# Patient Record
Sex: MALE | Race: WHITE | NOT HISPANIC OR LATINO | Employment: UNEMPLOYED | URBAN - NONMETROPOLITAN AREA
[De-identification: names, ages, dates, MRNs, and addresses within clinical notes are randomized per-mention and may not be internally consistent; named-entity substitution may affect disease eponyms.]

---

## 2024-05-06 ENCOUNTER — APPOINTMENT (OUTPATIENT)
Dept: CT IMAGING | Facility: HOSPITAL | Age: 64
DRG: 282 | End: 2024-05-06
Payer: COMMERCIAL

## 2024-05-06 ENCOUNTER — HOSPITAL ENCOUNTER (INPATIENT)
Facility: HOSPITAL | Age: 64
LOS: 1 days | Discharge: ANOTHER HOSPITAL | DRG: 282 | End: 2024-05-07
Attending: STUDENT IN AN ORGANIZED HEALTH CARE EDUCATION/TRAINING PROGRAM | Admitting: HOSPITALIST
Payer: COMMERCIAL

## 2024-05-06 ENCOUNTER — APPOINTMENT (OUTPATIENT)
Dept: GENERAL RADIOLOGY | Facility: HOSPITAL | Age: 64
DRG: 282 | End: 2024-05-06
Payer: COMMERCIAL

## 2024-05-06 DIAGNOSIS — I21.4 NSTEMI (NON-ST ELEVATED MYOCARDIAL INFARCTION): Primary | ICD-10-CM

## 2024-05-06 LAB
ALBUMIN SERPL-MCNC: 4.3 G/DL (ref 3.5–5.2)
ALBUMIN/GLOB SERPL: 1.6 G/DL
ALP SERPL-CCNC: 97 U/L (ref 39–117)
ALT SERPL W P-5'-P-CCNC: 16 U/L (ref 1–41)
ANION GAP SERPL CALCULATED.3IONS-SCNC: 10.4 MMOL/L (ref 5–15)
APTT PPP: 132 SECONDS (ref 26.5–34.5)
AST SERPL-CCNC: 18 U/L (ref 1–40)
BASOPHILS # BLD AUTO: 0.07 10*3/MM3 (ref 0–0.2)
BASOPHILS NFR BLD AUTO: 0.7 % (ref 0–1.5)
BILIRUB SERPL-MCNC: 0.4 MG/DL (ref 0–1.2)
BUN SERPL-MCNC: 16 MG/DL (ref 8–23)
BUN/CREAT SERPL: 13.8 (ref 7–25)
CALCIUM SPEC-SCNC: 9.6 MG/DL (ref 8.6–10.5)
CHLORIDE SERPL-SCNC: 100 MMOL/L (ref 98–107)
CK SERPL-CCNC: 90 U/L (ref 20–200)
CO2 SERPL-SCNC: 28.6 MMOL/L (ref 22–29)
CREAT SERPL-MCNC: 1.16 MG/DL (ref 0.76–1.27)
DEPRECATED RDW RBC AUTO: 46.2 FL (ref 37–54)
EGFRCR SERPLBLD CKD-EPI 2021: 71.7 ML/MIN/1.73
EOSINOPHIL # BLD AUTO: 0.21 10*3/MM3 (ref 0–0.4)
EOSINOPHIL NFR BLD AUTO: 2.2 % (ref 0.3–6.2)
ERYTHROCYTE [DISTWIDTH] IN BLOOD BY AUTOMATED COUNT: 13.6 % (ref 12.3–15.4)
GEN 5 2HR TROPONIN T REFLEX: 27 NG/L
GLOBULIN UR ELPH-MCNC: 2.7 GM/DL
GLUCOSE SERPL-MCNC: 86 MG/DL (ref 65–99)
HBA1C MFR BLD: 6.2 % (ref 4.8–5.6)
HCT VFR BLD AUTO: 44.6 % (ref 37.5–51)
HGB BLD-MCNC: 14.8 G/DL (ref 13–17.7)
HOLD SPECIMEN: NORMAL
HOLD SPECIMEN: NORMAL
IMM GRANULOCYTES # BLD AUTO: 0.03 10*3/MM3 (ref 0–0.05)
IMM GRANULOCYTES NFR BLD AUTO: 0.3 % (ref 0–0.5)
INR PPP: 0.93 (ref 0.9–1.1)
LYMPHOCYTES # BLD AUTO: 1.59 10*3/MM3 (ref 0.7–3.1)
LYMPHOCYTES NFR BLD AUTO: 16.6 % (ref 19.6–45.3)
MCH RBC QN AUTO: 30.4 PG (ref 26.6–33)
MCHC RBC AUTO-ENTMCNC: 33.2 G/DL (ref 31.5–35.7)
MCV RBC AUTO: 91.6 FL (ref 79–97)
MONOCYTES # BLD AUTO: 0.68 10*3/MM3 (ref 0.1–0.9)
MONOCYTES NFR BLD AUTO: 7.1 % (ref 5–12)
NEUTROPHILS NFR BLD AUTO: 6.98 10*3/MM3 (ref 1.7–7)
NEUTROPHILS NFR BLD AUTO: 73.1 % (ref 42.7–76)
NRBC BLD AUTO-RTO: 0 /100 WBC (ref 0–0.2)
PLATELET # BLD AUTO: 238 10*3/MM3 (ref 140–450)
PMV BLD AUTO: 10.4 FL (ref 6–12)
POTASSIUM SERPL-SCNC: 4.1 MMOL/L (ref 3.5–5.2)
PROT SERPL-MCNC: 7 G/DL (ref 6–8.5)
PROTHROMBIN TIME: 12.5 SECONDS (ref 12.1–14.7)
QT INTERVAL: 358 MS
QTC INTERVAL: 386 MS
RBC # BLD AUTO: 4.87 10*6/MM3 (ref 4.14–5.8)
SODIUM SERPL-SCNC: 139 MMOL/L (ref 136–145)
TROPONIN T DELTA: 9 NG/L
TROPONIN T SERPL HS-MCNC: 18 NG/L
TROPONIN T SERPL HS-MCNC: 67 NG/L
UFH PPP CHRO-ACNC: 0.44 IU/ML (ref 0.3–0.7)
WBC NRBC COR # BLD AUTO: 9.56 10*3/MM3 (ref 3.4–10.8)
WHOLE BLOOD HOLD COAG: NORMAL
WHOLE BLOOD HOLD SPECIMEN: NORMAL

## 2024-05-06 PROCEDURE — 25510000001 IOPAMIDOL PER 1 ML: Performed by: STUDENT IN AN ORGANIZED HEALTH CARE EDUCATION/TRAINING PROGRAM

## 2024-05-06 PROCEDURE — 75574 CT ANGIO HRT W/3D IMAGE: CPT | Performed by: RADIOLOGY

## 2024-05-06 PROCEDURE — 82550 ASSAY OF CK (CPK): CPT | Performed by: STUDENT IN AN ORGANIZED HEALTH CARE EDUCATION/TRAINING PROGRAM

## 2024-05-06 PROCEDURE — 99285 EMERGENCY DEPT VISIT HI MDM: CPT

## 2024-05-06 PROCEDURE — 71045 X-RAY EXAM CHEST 1 VIEW: CPT

## 2024-05-06 PROCEDURE — 99223 1ST HOSP IP/OBS HIGH 75: CPT | Performed by: HOSPITALIST

## 2024-05-06 PROCEDURE — 71045 X-RAY EXAM CHEST 1 VIEW: CPT | Performed by: RADIOLOGY

## 2024-05-06 PROCEDURE — 84484 ASSAY OF TROPONIN QUANT: CPT | Performed by: STUDENT IN AN ORGANIZED HEALTH CARE EDUCATION/TRAINING PROGRAM

## 2024-05-06 PROCEDURE — 85730 THROMBOPLASTIN TIME PARTIAL: CPT | Performed by: STUDENT IN AN ORGANIZED HEALTH CARE EDUCATION/TRAINING PROGRAM

## 2024-05-06 PROCEDURE — 93010 ELECTROCARDIOGRAM REPORT: CPT | Performed by: INTERNAL MEDICINE

## 2024-05-06 PROCEDURE — 75574 CT ANGIO HRT W/3D IMAGE: CPT

## 2024-05-06 PROCEDURE — 25010000002 HEPARIN (PORCINE) 25000-0.45 UT/250ML-% SOLUTION: Performed by: STUDENT IN AN ORGANIZED HEALTH CARE EDUCATION/TRAINING PROGRAM

## 2024-05-06 PROCEDURE — 83036 HEMOGLOBIN GLYCOSYLATED A1C: CPT | Performed by: HOSPITALIST

## 2024-05-06 PROCEDURE — 85520 HEPARIN ASSAY: CPT | Performed by: STUDENT IN AN ORGANIZED HEALTH CARE EDUCATION/TRAINING PROGRAM

## 2024-05-06 PROCEDURE — 85025 COMPLETE CBC W/AUTO DIFF WBC: CPT | Performed by: STUDENT IN AN ORGANIZED HEALTH CARE EDUCATION/TRAINING PROGRAM

## 2024-05-06 PROCEDURE — 25010000002 HEPARIN (PORCINE) PER 1000 UNITS: Performed by: STUDENT IN AN ORGANIZED HEALTH CARE EDUCATION/TRAINING PROGRAM

## 2024-05-06 PROCEDURE — 93005 ELECTROCARDIOGRAM TRACING: CPT | Performed by: STUDENT IN AN ORGANIZED HEALTH CARE EDUCATION/TRAINING PROGRAM

## 2024-05-06 PROCEDURE — 85610 PROTHROMBIN TIME: CPT | Performed by: STUDENT IN AN ORGANIZED HEALTH CARE EDUCATION/TRAINING PROGRAM

## 2024-05-06 PROCEDURE — 36415 COLL VENOUS BLD VENIPUNCTURE: CPT

## 2024-05-06 PROCEDURE — 80053 COMPREHEN METABOLIC PANEL: CPT | Performed by: STUDENT IN AN ORGANIZED HEALTH CARE EDUCATION/TRAINING PROGRAM

## 2024-05-06 RX ORDER — AMOXICILLIN 250 MG
2 CAPSULE ORAL 2 TIMES DAILY PRN
Status: DISCONTINUED | OUTPATIENT
Start: 2024-05-06 | End: 2024-05-07 | Stop reason: HOSPADM

## 2024-05-06 RX ORDER — METOPROLOL TARTRATE 50 MG/1
50 TABLET, FILM COATED ORAL ONCE AS NEEDED
Status: COMPLETED | OUTPATIENT
Start: 2024-05-06 | End: 2024-05-06

## 2024-05-06 RX ORDER — CHOLECALCIFEROL (VITAMIN D3) 125 MCG
10 CAPSULE ORAL ONCE
Status: DISCONTINUED | OUTPATIENT
Start: 2024-05-06 | End: 2024-05-07 | Stop reason: HOSPADM

## 2024-05-06 RX ORDER — ASPIRIN 81 MG/1
81 TABLET, CHEWABLE ORAL DAILY
Status: DISCONTINUED | OUTPATIENT
Start: 2024-05-07 | End: 2024-05-07 | Stop reason: HOSPADM

## 2024-05-06 RX ORDER — NITROGLYCERIN 0.4 MG/1
0.4 TABLET SUBLINGUAL ONCE
Status: COMPLETED | OUTPATIENT
Start: 2024-05-06 | End: 2024-05-06

## 2024-05-06 RX ORDER — HEPARIN SODIUM 10000 [USP'U]/100ML
12 INJECTION, SOLUTION INTRAVENOUS
Status: DISCONTINUED | OUTPATIENT
Start: 2024-05-06 | End: 2024-05-07 | Stop reason: HOSPADM

## 2024-05-06 RX ORDER — SODIUM CHLORIDE 9 MG/ML
75 INJECTION, SOLUTION INTRAVENOUS CONTINUOUS
Status: DISCONTINUED | OUTPATIENT
Start: 2024-05-07 | End: 2024-05-07 | Stop reason: HOSPADM

## 2024-05-06 RX ORDER — SODIUM CHLORIDE 0.9 % (FLUSH) 0.9 %
10 SYRINGE (ML) INJECTION EVERY 12 HOURS SCHEDULED
Status: DISCONTINUED | OUTPATIENT
Start: 2024-05-07 | End: 2024-05-07 | Stop reason: HOSPADM

## 2024-05-06 RX ORDER — HEPARIN SODIUM 5000 [USP'U]/ML
25 INJECTION, SOLUTION INTRAVENOUS; SUBCUTANEOUS AS NEEDED
Status: DISCONTINUED | OUTPATIENT
Start: 2024-05-06 | End: 2024-05-07 | Stop reason: HOSPADM

## 2024-05-06 RX ORDER — HEPARIN SODIUM 5000 [USP'U]/ML
50 INJECTION, SOLUTION INTRAVENOUS; SUBCUTANEOUS AS NEEDED
Status: DISCONTINUED | OUTPATIENT
Start: 2024-05-06 | End: 2024-05-07 | Stop reason: HOSPADM

## 2024-05-06 RX ORDER — POLYETHYLENE GLYCOL 3350 17 G/17G
17 POWDER, FOR SOLUTION ORAL DAILY PRN
Status: DISCONTINUED | OUTPATIENT
Start: 2024-05-06 | End: 2024-05-07 | Stop reason: HOSPADM

## 2024-05-06 RX ORDER — BISACODYL 10 MG
10 SUPPOSITORY, RECTAL RECTAL DAILY PRN
Status: DISCONTINUED | OUTPATIENT
Start: 2024-05-06 | End: 2024-05-07 | Stop reason: HOSPADM

## 2024-05-06 RX ORDER — SODIUM CHLORIDE 9 MG/ML
40 INJECTION, SOLUTION INTRAVENOUS AS NEEDED
Status: DISCONTINUED | OUTPATIENT
Start: 2024-05-06 | End: 2024-05-07 | Stop reason: HOSPADM

## 2024-05-06 RX ORDER — HEPARIN SODIUM 5000 [USP'U]/ML
4000 INJECTION, SOLUTION INTRAVENOUS; SUBCUTANEOUS ONCE
Status: COMPLETED | OUTPATIENT
Start: 2024-05-06 | End: 2024-05-06

## 2024-05-06 RX ORDER — NITROGLYCERIN 0.4 MG/1
0.4 TABLET SUBLINGUAL
Status: DISCONTINUED | OUTPATIENT
Start: 2024-05-06 | End: 2024-05-07 | Stop reason: HOSPADM

## 2024-05-06 RX ORDER — ASPIRIN 325 MG
325 TABLET ORAL ONCE
Status: COMPLETED | OUTPATIENT
Start: 2024-05-06 | End: 2024-05-06

## 2024-05-06 RX ORDER — SODIUM CHLORIDE 0.9 % (FLUSH) 0.9 %
10 SYRINGE (ML) INJECTION AS NEEDED
Status: DISCONTINUED | OUTPATIENT
Start: 2024-05-06 | End: 2024-05-07 | Stop reason: HOSPADM

## 2024-05-06 RX ORDER — ROSUVASTATIN CALCIUM 20 MG/1
20 TABLET, COATED ORAL NIGHTLY
Status: DISCONTINUED | OUTPATIENT
Start: 2024-05-07 | End: 2024-05-07 | Stop reason: HOSPADM

## 2024-05-06 RX ORDER — ROSUVASTATIN CALCIUM 20 MG/1
20 TABLET, COATED ORAL ONCE
Status: COMPLETED | OUTPATIENT
Start: 2024-05-06 | End: 2024-05-06

## 2024-05-06 RX ORDER — BISACODYL 5 MG/1
5 TABLET, DELAYED RELEASE ORAL DAILY PRN
Status: DISCONTINUED | OUTPATIENT
Start: 2024-05-06 | End: 2024-05-07 | Stop reason: HOSPADM

## 2024-05-06 RX ADMIN — ASPIRIN 325 MG: 325 TABLET ORAL at 15:03

## 2024-05-06 RX ADMIN — IOPAMIDOL 100 ML: 755 INJECTION, SOLUTION INTRAVENOUS at 18:14

## 2024-05-06 RX ADMIN — HEPARIN SODIUM 12 UNITS/KG/HR: 10000 INJECTION, SOLUTION INTRAVENOUS at 22:03

## 2024-05-06 RX ADMIN — METOPROLOL TARTRATE 50 MG: 50 TABLET, FILM COATED ORAL at 16:59

## 2024-05-06 RX ADMIN — HEPARIN SODIUM 4000 UNITS: 5000 INJECTION INTRAVENOUS; SUBCUTANEOUS at 22:01

## 2024-05-06 RX ADMIN — NITROGLYCERIN 0.4 MG: 0.4 TABLET, ORALLY DISINTEGRATING SUBLINGUAL at 18:05

## 2024-05-06 RX ADMIN — ROSUVASTATIN CALCIUM 20 MG: 20 TABLET, FILM COATED ORAL at 22:04

## 2024-05-06 NOTE — ED PROVIDER NOTES
"Subjective   History of Present Illness  Patient is a 63-year-old male who comes to the ER with complaints of chest pain.  Chest pain started around 1 PM today.  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  He says it was like an elephant and heavy.  It radiated to his bilateral neck.  At the worst it was a 3/10.  It did not get worse with exertion.  Did not get worse with rest.  He said it was continuous for about an hour and did not ease up.  He \"spit up \"but did not feel nauseous.  He thought maybe he was dehydrated as well.  He does note he has been having intermittent sharp pains during his weekly jogs but has not thought much of it and try to shake it off.  He also tried to push through it today but could not due to the persistence of pain.  He took a Tylenol, did not have any nitroglycerin and came to the ER for evaluation.    At the time of my exam, patient denies any significant chest pain, rates it a 1/10.  Received a full dose aspirin on arrival.      Review of Systems   Constitutional:  Negative for chills, fatigue and fever.   HENT:  Negative for congestion, sinus pain and sore throat.    Respiratory:  Negative for cough, chest tightness, shortness of breath and wheezing.    Cardiovascular:  Positive for chest pain. Negative for palpitations and leg swelling.   Gastrointestinal:  Negative for abdominal pain, constipation, diarrhea, nausea and vomiting.   Genitourinary:  Negative for dysuria, frequency, hematuria and urgency.   Musculoskeletal:  Negative for arthralgias and myalgias.   Neurological:  Negative for dizziness, numbness and headaches.   Psychiatric/Behavioral:  Negative for confusion.        No past medical history on file.    No Known Allergies    No past surgical history on file.    No family history on file.    Social History     Socioeconomic History    Marital status:  "           Objective   Physical Exam  Vitals and nursing note reviewed.   Constitutional:       General: He is not in acute distress.     Appearance: He is well-developed and normal weight. He is not ill-appearing.   HENT:      Head: Normocephalic.      Mouth/Throat:      Mouth: Mucous membranes are moist.      Pharynx: Oropharynx is clear.   Eyes:      Extraocular Movements: Extraocular movements intact.      Pupils: Pupils are equal, round, and reactive to light.   Neck:      Vascular: No JVD.   Cardiovascular:      Rate and Rhythm: Normal rate and regular rhythm.      Heart sounds: No murmur heard.     No friction rub. No gallop.   Pulmonary:      Effort: Pulmonary effort is normal. No tachypnea.      Breath sounds: Normal breath sounds. No decreased breath sounds, wheezing, rhonchi or rales.   Chest:      Chest wall: No tenderness.   Abdominal:      General: Bowel sounds are normal.      Palpations: Abdomen is soft.   Musculoskeletal:         General: Normal range of motion.      Cervical back: Normal range of motion and neck supple.      Right lower leg: No edema.      Left lower leg: No edema.   Skin:     General: Skin is warm and dry.      Capillary Refill: Capillary refill takes less than 2 seconds.   Neurological:      General: No focal deficit present.      Mental Status: He is alert and oriented to person, place, and time.   Psychiatric:         Mood and Affect: Mood normal.         Behavior: Behavior normal.         Procedures           ED Course  ED Course as of 05/06/24 2210   Mon May 06, 2024   1413 EKG notes sinus rhythm.  70 bpm.  No acute ST elevation.  Possible early repolarization noted.  70 bpm.  QTc 386.  Electronically signed by Jorge A Alarcon DO, 05/06/24, 2:14 PM EDT.   [SF]   0790 ECG 12 Lead Chest Pain  Sinus bradycardia, rate 55, QTc 382, no acute ST or T wave changes [CW]      ED Course User Index  [CW] Camden Da Silva DO  [SF] Jorge A Alarcon DO                HEART Score:  5                              Medical Decision Making  --He describes crescendo like chest pain and had exertional chest pain today, EKG no significant ischemic changes  --Initial troponin 18, repeat 27, 67  --Repeat EKG kwaku w/out ischemic changes  --d/w cards, rec CTA coronary--> high-grade hemodynamically significant stenosis of the LAD/RCA with recs for heart cath  --Discussed with interventional cardiology, n.p.o. at midnight, tentative plans for heart cath tomorrow   --discussed with medicine, will admit    Problems Addressed:  NSTEMI (non-ST elevated myocardial infarction): complicated acute illness or injury    Amount and/or Complexity of Data Reviewed  Labs: ordered.  Radiology: ordered.  ECG/medicine tests: ordered. Decision-making details documented in ED Course.    Risk  OTC drugs.  Prescription drug management.        Final diagnoses:   NSTEMI (non-ST elevated myocardial infarction)       ED Disposition  ED Disposition       ED Disposition   Decision to Admit    Condition   --    Comment   --               No follow-up provider specified.       Medication List      No changes were made to your prescriptions during this visit.            Camden Da Silva DO  05/06/24 [id number]

## 2024-05-07 ENCOUNTER — HOSPITAL ENCOUNTER (INPATIENT)
Facility: HOSPITAL | Age: 64
LOS: 1 days | Discharge: HOME | End: 2024-05-08
Attending: INTERNAL MEDICINE | Admitting: INTERNAL MEDICINE
Payer: COMMERCIAL

## 2024-05-07 VITALS
SYSTOLIC BLOOD PRESSURE: 150 MMHG | WEIGHT: 186.5 LBS | OXYGEN SATURATION: 98 % | HEIGHT: 71 IN | RESPIRATION RATE: 16 BRPM | DIASTOLIC BLOOD PRESSURE: 96 MMHG | BODY MASS INDEX: 26.11 KG/M2 | TEMPERATURE: 98.2 F | HEART RATE: 81 BPM

## 2024-05-07 PROBLEM — I24.9 ACS (ACUTE CORONARY SYNDROME): Status: ACTIVE | Noted: 2024-05-07

## 2024-05-07 LAB
ALBUMIN SERPL-MCNC: 3.9 G/DL (ref 3.5–5.2)
ALBUMIN SERPL-MCNC: 4.1 G/DL (ref 3.5–5.2)
ALBUMIN/GLOB SERPL: 1.4 G/DL
ALBUMIN/GLOB SERPL: 1.7 G/DL
ALP SERPL-CCNC: 100 U/L (ref 39–117)
ALP SERPL-CCNC: 101 U/L (ref 39–117)
ALT SERPL W P-5'-P-CCNC: 16 U/L (ref 1–41)
ALT SERPL W P-5'-P-CCNC: 17 U/L (ref 1–41)
ANION GAP SERPL CALCULATED.3IONS-SCNC: 11 MMOL/L (ref 5–15)
ANION GAP SERPL CALCULATED.3IONS-SCNC: 6.2 MMOL/L (ref 5–15)
APTT PPP: 44.7 SECONDS (ref 22–39)
AST SERPL-CCNC: 27 U/L (ref 1–40)
AST SERPL-CCNC: 29 U/L (ref 1–40)
BASOPHILS # BLD AUTO: 0.07 10*3/MM3 (ref 0–0.2)
BASOPHILS NFR BLD AUTO: 0.5 % (ref 0–1.5)
BILIRUB SERPL-MCNC: 0.5 MG/DL (ref 0–1.2)
BILIRUB SERPL-MCNC: 0.8 MG/DL (ref 0–1.2)
BUN SERPL-MCNC: 11 MG/DL (ref 8–23)
BUN SERPL-MCNC: 15 MG/DL (ref 8–23)
BUN/CREAT SERPL: 12.4 (ref 7–25)
BUN/CREAT SERPL: 14.4 (ref 7–25)
CALCIUM SPEC-SCNC: 9 MG/DL (ref 8.6–10.5)
CALCIUM SPEC-SCNC: 9.1 MG/DL (ref 8.6–10.5)
CATH EF ESTIMATED: 55 %
CHLORIDE SERPL-SCNC: 102 MMOL/L (ref 98–107)
CHLORIDE SERPL-SCNC: 103 MMOL/L (ref 98–107)
CHOLEST SERPL-MCNC: 194 MG/DL (ref 0–200)
CK MB SERPL-CCNC: 20.89 NG/ML
CK SERPL-CCNC: 187 U/L (ref 20–200)
CO2 SERPL-SCNC: 24 MMOL/L (ref 22–29)
CO2 SERPL-SCNC: 27.8 MMOL/L (ref 22–29)
CREAT SERPL-MCNC: 0.89 MG/DL (ref 0.76–1.27)
CREAT SERPL-MCNC: 1.04 MG/DL (ref 0.76–1.27)
D-LACTATE SERPL-SCNC: 1.7 MMOL/L (ref 0.5–2)
DEPRECATED RDW RBC AUTO: 47.1 FL (ref 37–54)
DEPRECATED RDW RBC AUTO: 47.9 FL (ref 37–54)
EGFRCR SERPLBLD CKD-EPI 2021: 81.7 ML/MIN/1.73
EGFRCR SERPLBLD CKD-EPI 2021: 97.5 ML/MIN/1.73
EOSINOPHIL # BLD AUTO: 0.28 10*3/MM3 (ref 0–0.4)
EOSINOPHIL NFR BLD AUTO: 1.9 % (ref 0.3–6.2)
ERYTHROCYTE [DISTWIDTH] IN BLOOD BY AUTOMATED COUNT: 14 % (ref 12.3–15.4)
ERYTHROCYTE [DISTWIDTH] IN BLOOD BY AUTOMATED COUNT: 14.1 % (ref 12.3–15.4)
GEN 5 2HR TROPONIN T REFLEX: 522 NG/L
GLOBULIN UR ELPH-MCNC: 2.4 GM/DL
GLOBULIN UR ELPH-MCNC: 2.8 GM/DL
GLUCOSE BLDC GLUCOMTR-MCNC: 141 MG/DL (ref 70–130)
GLUCOSE SERPL-MCNC: 142 MG/DL (ref 65–99)
GLUCOSE SERPL-MCNC: 96 MG/DL (ref 65–99)
HCT VFR BLD AUTO: 42.1 % (ref 37.5–51)
HCT VFR BLD AUTO: 42.5 % (ref 37.5–51)
HDLC SERPL-MCNC: 43 MG/DL (ref 40–60)
HGB BLD-MCNC: 14 G/DL (ref 13–17.7)
HGB BLD-MCNC: 14 G/DL (ref 13–17.7)
IMM GRANULOCYTES # BLD AUTO: 0.06 10*3/MM3 (ref 0–0.05)
IMM GRANULOCYTES NFR BLD AUTO: 0.4 % (ref 0–0.5)
LDLC SERPL CALC-MCNC: 129 MG/DL (ref 0–100)
LDLC/HDLC SERPL: 2.96 {RATIO}
LYMPHOCYTES # BLD AUTO: 2.38 10*3/MM3 (ref 0.7–3.1)
LYMPHOCYTES NFR BLD AUTO: 16.2 % (ref 19.6–45.3)
MAGNESIUM SERPL-MCNC: 2 MG/DL (ref 1.6–2.4)
MCH RBC QN AUTO: 30.2 PG (ref 26.6–33)
MCH RBC QN AUTO: 30.6 PG (ref 26.6–33)
MCHC RBC AUTO-ENTMCNC: 32.9 G/DL (ref 31.5–35.7)
MCHC RBC AUTO-ENTMCNC: 33.3 G/DL (ref 31.5–35.7)
MCV RBC AUTO: 90.9 FL (ref 79–97)
MCV RBC AUTO: 92.8 FL (ref 79–97)
MONOCYTES # BLD AUTO: 0.97 10*3/MM3 (ref 0.1–0.9)
MONOCYTES NFR BLD AUTO: 6.6 % (ref 5–12)
NEUTROPHILS NFR BLD AUTO: 10.95 10*3/MM3 (ref 1.7–7)
NEUTROPHILS NFR BLD AUTO: 74.4 % (ref 42.7–76)
NRBC BLD AUTO-RTO: 0 /100 WBC (ref 0–0.2)
PLATELET # BLD AUTO: 193 10*3/MM3 (ref 140–450)
PLATELET # BLD AUTO: 226 10*3/MM3 (ref 140–450)
PMV BLD AUTO: 10.7 FL (ref 6–12)
PMV BLD AUTO: 10.7 FL (ref 6–12)
POTASSIUM SERPL-SCNC: 3.8 MMOL/L (ref 3.5–5.2)
POTASSIUM SERPL-SCNC: 4.1 MMOL/L (ref 3.5–5.2)
PROT SERPL-MCNC: 6.5 G/DL (ref 6–8.5)
PROT SERPL-MCNC: 6.7 G/DL (ref 6–8.5)
QT INTERVAL: 400 MS
QTC INTERVAL: 382 MS
RBC # BLD AUTO: 4.58 10*6/MM3 (ref 4.14–5.8)
RBC # BLD AUTO: 4.63 10*6/MM3 (ref 4.14–5.8)
SODIUM SERPL-SCNC: 137 MMOL/L (ref 136–145)
SODIUM SERPL-SCNC: 137 MMOL/L (ref 136–145)
TRIGL SERPL-MCNC: 119 MG/DL (ref 0–150)
TROPONIN T DELTA: 98 NG/L
TROPONIN T SERPL HS-MCNC: 407 NG/L
TROPONIN T SERPL HS-MCNC: 424 NG/L
UFH PPP CHRO-ACNC: 0.32 IU/ML (ref 0.3–0.7)
VLDLC SERPL-MCNC: 22 MG/DL (ref 5–40)
WBC NRBC COR # BLD AUTO: 14.71 10*3/MM3 (ref 3.4–10.8)
WBC NRBC COR # BLD AUTO: 14.77 10*3/MM3 (ref 3.4–10.8)

## 2024-05-07 PROCEDURE — 99239 HOSP IP/OBS DSCHRG MGMT >30: CPT | Performed by: INTERNAL MEDICINE

## 2024-05-07 PROCEDURE — 25010000002 HEPARIN (PORCINE) PER 1000 UNITS: Performed by: INTERNAL MEDICINE

## 2024-05-07 PROCEDURE — 80053 COMPREHEN METABOLIC PANEL: CPT | Performed by: INTERNAL MEDICINE

## 2024-05-07 PROCEDURE — 85520 HEPARIN ASSAY: CPT | Performed by: STUDENT IN AN ORGANIZED HEALTH CARE EDUCATION/TRAINING PROGRAM

## 2024-05-07 PROCEDURE — 82550 ASSAY OF CK (CPK): CPT | Performed by: INTERNAL MEDICINE

## 2024-05-07 PROCEDURE — 93005 ELECTROCARDIOGRAM TRACING: CPT | Performed by: INTERNAL MEDICINE

## 2024-05-07 PROCEDURE — 83605 ASSAY OF LACTIC ACID: CPT | Performed by: INTERNAL MEDICINE

## 2024-05-07 PROCEDURE — 93458 L HRT ARTERY/VENTRICLE ANGIO: CPT | Performed by: INTERNAL MEDICINE

## 2024-05-07 PROCEDURE — 25010000002 CANGRELOR TETRASODIUM 50 MG RECONSTITUTED SOLUTION 1 EACH VIAL: Performed by: INTERNAL MEDICINE

## 2024-05-07 PROCEDURE — 25010000002 BIVALIRUDIN TRIFLUOROACETATE 250 MG RECONSTITUTED SOLUTION 1 EACH VIAL: Performed by: INTERNAL MEDICINE

## 2024-05-07 PROCEDURE — C1894 INTRO/SHEATH, NON-LASER: HCPCS | Performed by: INTERNAL MEDICINE

## 2024-05-07 PROCEDURE — 25010000002 NICARDIPINE 2.5 MG/ML SOLUTION: Performed by: INTERNAL MEDICINE

## 2024-05-07 PROCEDURE — C1874 STENT, COATED/COV W/DEL SYS: HCPCS | Performed by: INTERNAL MEDICINE

## 2024-05-07 PROCEDURE — 82948 REAGENT STRIP/BLOOD GLUCOSE: CPT

## 2024-05-07 PROCEDURE — C1887 CATHETER, GUIDING: HCPCS | Performed by: INTERNAL MEDICINE

## 2024-05-07 PROCEDURE — B2151ZZ FLUOROSCOPY OF LEFT HEART USING LOW OSMOLAR CONTRAST: ICD-10-PCS | Performed by: NURSE PRACTITIONER

## 2024-05-07 PROCEDURE — B2111ZZ FLUOROSCOPY OF MULTIPLE CORONARY ARTERIES USING LOW OSMOLAR CONTRAST: ICD-10-PCS | Performed by: NURSE PRACTITIONER

## 2024-05-07 PROCEDURE — C1725 CATH, TRANSLUMIN NON-LASER: HCPCS | Performed by: INTERNAL MEDICINE

## 2024-05-07 PROCEDURE — 25010000002 FENTANYL CITRATE (PF) 50 MCG/ML SOLUTION: Performed by: INTERNAL MEDICINE

## 2024-05-07 PROCEDURE — C1769 GUIDE WIRE: HCPCS | Performed by: INTERNAL MEDICINE

## 2024-05-07 PROCEDURE — 85730 THROMBOPLASTIN TIME PARTIAL: CPT | Performed by: INTERNAL MEDICINE

## 2024-05-07 PROCEDURE — 93799 UNLISTED CV SVC/PROCEDURE: CPT | Performed by: INTERNAL MEDICINE

## 2024-05-07 PROCEDURE — 25010000002 NITROGLYCERIN 200 MCG/ML SOLUTION: Performed by: INTERNAL MEDICINE

## 2024-05-07 PROCEDURE — 25510000001 IOPAMIDOL PER 1 ML: Performed by: INTERNAL MEDICINE

## 2024-05-07 PROCEDURE — C9600 PERC DRUG-EL COR STENT SING: HCPCS | Performed by: INTERNAL MEDICINE

## 2024-05-07 PROCEDURE — 25010000002 MIDAZOLAM PER 1 MG: Performed by: INTERNAL MEDICINE

## 2024-05-07 PROCEDURE — 25010000002 EPTIFIBATIDE 20 MG/10ML SOLUTION: Performed by: INTERNAL MEDICINE

## 2024-05-07 PROCEDURE — 83735 ASSAY OF MAGNESIUM: CPT | Performed by: INTERNAL MEDICINE

## 2024-05-07 PROCEDURE — 25810000003 SODIUM CHLORIDE 0.9 % SOLUTION: Performed by: HOSPITALIST

## 2024-05-07 PROCEDURE — 80061 LIPID PANEL: CPT | Performed by: HOSPITALIST

## 2024-05-07 PROCEDURE — 80053 COMPREHEN METABOLIC PANEL: CPT | Performed by: HOSPITALIST

## 2024-05-07 PROCEDURE — 85027 COMPLETE CBC AUTOMATED: CPT | Performed by: INTERNAL MEDICINE

## 2024-05-07 PROCEDURE — 25810000003 SODIUM CHLORIDE 0.9 % SOLUTION: Performed by: INTERNAL MEDICINE

## 2024-05-07 PROCEDURE — 027136Z DILATION OF CORONARY ARTERY, TWO ARTERIES WITH THREE DRUG-ELUTING INTRALUMINAL DEVICES, PERCUTANEOUS APPROACH: ICD-10-PCS | Performed by: INTERNAL MEDICINE

## 2024-05-07 PROCEDURE — 4A023N7 MEASUREMENT OF CARDIAC SAMPLING AND PRESSURE, LEFT HEART, PERCUTANEOUS APPROACH: ICD-10-PCS | Performed by: NURSE PRACTITIONER

## 2024-05-07 PROCEDURE — 36415 COLL VENOUS BLD VENIPUNCTURE: CPT | Performed by: STUDENT IN AN ORGANIZED HEALTH CARE EDUCATION/TRAINING PROGRAM

## 2024-05-07 PROCEDURE — C1760 CLOSURE DEV, VASC: HCPCS | Performed by: INTERNAL MEDICINE

## 2024-05-07 PROCEDURE — 84484 ASSAY OF TROPONIN QUANT: CPT | Performed by: INTERNAL MEDICINE

## 2024-05-07 PROCEDURE — 82553 CREATINE MB FRACTION: CPT | Performed by: INTERNAL MEDICINE

## 2024-05-07 PROCEDURE — 92928 PRQ TCAT PLMT NTRAC ST 1 LES: CPT | Performed by: INTERNAL MEDICINE

## 2024-05-07 PROCEDURE — 99222 1ST HOSP IP/OBS MODERATE 55: CPT | Performed by: INTERNAL MEDICINE

## 2024-05-07 PROCEDURE — 25010000002 EPTIFIBATIDE PER 5 MG: Performed by: INTERNAL MEDICINE

## 2024-05-07 PROCEDURE — 25810000003 SODIUM CHLORIDE 0.9 % SOLUTION 250 ML FLEX CONT: Performed by: INTERNAL MEDICINE

## 2024-05-07 PROCEDURE — 85025 COMPLETE CBC W/AUTO DIFF WBC: CPT | Performed by: HOSPITALIST

## 2024-05-07 PROCEDURE — C9460 INJECTION, CANGRELOR: HCPCS | Performed by: INTERNAL MEDICINE

## 2024-05-07 DEVICE — XIENCE SKYPOINT™ EVEROLIMUS ELUTING CORONARY STENT SYSTEM 4.00 MM X 28 MM / RAPID-EXCHANGE
Type: IMPLANTABLE DEVICE | Status: FUNCTIONAL
Brand: XIENCE SKYPOINT™

## 2024-05-07 DEVICE — XIENCE SKYPOINT™ EVEROLIMUS ELUTING CORONARY STENT SYSTEM 3.00 MM X 18 MM / RAPID-EXCHANGE
Type: IMPLANTABLE DEVICE | Status: FUNCTIONAL
Brand: XIENCE SKYPOINT™

## 2024-05-07 DEVICE — XIENCE SKYPOINT™ EVEROLIMUS ELUTING CORONARY STENT SYSTEM 4.00 MM X 33 MM / RAPID-EXCHANGE
Type: IMPLANTABLE DEVICE | Status: FUNCTIONAL
Brand: XIENCE SKYPOINT™

## 2024-05-07 RX ORDER — NITROGLYCERIN 0.4 MG/1
0.4 TABLET SUBLINGUAL
Status: DISCONTINUED | OUTPATIENT
Start: 2024-05-07 | End: 2024-05-08 | Stop reason: HOSPADM

## 2024-05-07 RX ORDER — ASPIRIN 81 MG/1
81 TABLET ORAL DAILY
Status: DISCONTINUED | OUTPATIENT
Start: 2024-05-08 | End: 2024-05-08 | Stop reason: HOSPADM

## 2024-05-07 RX ORDER — SODIUM CHLORIDE 9 MG/ML
INJECTION, SOLUTION INTRAVENOUS
Status: COMPLETED | OUTPATIENT
Start: 2024-05-07 | End: 2024-05-07

## 2024-05-07 RX ORDER — SODIUM CHLORIDE 9 MG/ML
1 INJECTION, SOLUTION INTRAVENOUS CONTINUOUS
Status: CANCELLED | OUTPATIENT
Start: 2024-05-07 | End: 2024-05-07

## 2024-05-07 RX ORDER — ONDANSETRON 4 MG/1
4 TABLET, ORALLY DISINTEGRATING ORAL EVERY 6 HOURS PRN
Status: DISCONTINUED | OUTPATIENT
Start: 2024-05-07 | End: 2024-05-08 | Stop reason: HOSPADM

## 2024-05-07 RX ORDER — ALPRAZOLAM 0.25 MG/1
0.25 TABLET ORAL 3 TIMES DAILY PRN
Status: DISCONTINUED | OUTPATIENT
Start: 2024-05-07 | End: 2024-05-08 | Stop reason: HOSPADM

## 2024-05-07 RX ORDER — AMOXICILLIN 250 MG
2 CAPSULE ORAL 2 TIMES DAILY
Status: CANCELLED | OUTPATIENT
Start: 2024-05-07

## 2024-05-07 RX ORDER — NICARDIPINE HCL-0.9% SOD CHLOR 1 MG/10 ML
SYRINGE (ML) INTRAVENOUS
Status: DISCONTINUED | OUTPATIENT
Start: 2024-05-07 | End: 2024-05-07 | Stop reason: HOSPADM

## 2024-05-07 RX ORDER — LIDOCAINE HYDROCHLORIDE 20 MG/ML
INJECTION, SOLUTION INFILTRATION; PERINEURAL
Status: DISCONTINUED | OUTPATIENT
Start: 2024-05-07 | End: 2024-05-07 | Stop reason: HOSPADM

## 2024-05-07 RX ORDER — EPTIFIBATIDE 0.75 MG/ML
2 INJECTION, SOLUTION INTRAVENOUS CONTINUOUS
Status: DISPENSED | OUTPATIENT
Start: 2024-05-07 | End: 2024-05-08

## 2024-05-07 RX ORDER — METOPROLOL TARTRATE 100 MG/1
100 TABLET ORAL ONCE AS NEEDED
Status: DISCONTINUED | OUTPATIENT
Start: 2024-05-07 | End: 2024-05-07 | Stop reason: HOSPADM

## 2024-05-07 RX ORDER — ACETAMINOPHEN 325 MG/1
650 TABLET ORAL EVERY 4 HOURS PRN
Status: DISCONTINUED | OUTPATIENT
Start: 2024-05-07 | End: 2024-05-08 | Stop reason: HOSPADM

## 2024-05-07 RX ORDER — LIDOCAINE HYDROCHLORIDE 10 MG/ML
INJECTION, SOLUTION EPIDURAL; INFILTRATION; INTRACAUDAL; PERINEURAL
Status: DISCONTINUED | OUTPATIENT
Start: 2024-05-07 | End: 2024-05-07 | Stop reason: HOSPADM

## 2024-05-07 RX ORDER — BISACODYL 5 MG/1
5 TABLET, DELAYED RELEASE ORAL DAILY PRN
Status: CANCELLED | OUTPATIENT
Start: 2024-05-07

## 2024-05-07 RX ORDER — SODIUM CHLORIDE 9 MG/ML
40 INJECTION, SOLUTION INTRAVENOUS AS NEEDED
Status: CANCELLED | OUTPATIENT
Start: 2024-05-07

## 2024-05-07 RX ORDER — SODIUM CHLORIDE 0.9 % (FLUSH) 0.9 %
10 SYRINGE (ML) INJECTION EVERY 12 HOURS SCHEDULED
Status: CANCELLED | OUTPATIENT
Start: 2024-05-07

## 2024-05-07 RX ORDER — METOPROLOL TARTRATE 100 MG/1
50 TABLET ORAL ONCE AS NEEDED
Status: ON HOLD
Start: 2024-05-07 | End: 2024-05-07

## 2024-05-07 RX ORDER — NITROGLYCERIN 20 MG/100ML
5-200 INJECTION INTRAVENOUS
Status: DISCONTINUED | OUTPATIENT
Start: 2024-05-07 | End: 2024-05-07

## 2024-05-07 RX ORDER — SODIUM CHLORIDE 9 MG/ML
40 INJECTION, SOLUTION INTRAVENOUS AS NEEDED
Status: DISCONTINUED | OUTPATIENT
Start: 2024-05-07 | End: 2024-05-07 | Stop reason: HOSPADM

## 2024-05-07 RX ORDER — SODIUM CHLORIDE 9 MG/ML
100 INJECTION, SOLUTION INTRAVENOUS CONTINUOUS
Status: ACTIVE | OUTPATIENT
Start: 2024-05-07 | End: 2024-05-07

## 2024-05-07 RX ORDER — ACETAMINOPHEN 325 MG/1
650 TABLET ORAL EVERY 4 HOURS PRN
Status: DISCONTINUED | OUTPATIENT
Start: 2024-05-07 | End: 2024-05-07

## 2024-05-07 RX ORDER — MIDAZOLAM HYDROCHLORIDE 1 MG/ML
INJECTION INTRAMUSCULAR; INTRAVENOUS
Status: DISCONTINUED | OUTPATIENT
Start: 2024-05-07 | End: 2024-05-07 | Stop reason: HOSPADM

## 2024-05-07 RX ORDER — NOREPINEPHRINE BITARTRATE 0.03 MG/ML
.02-.3 INJECTION, SOLUTION INTRAVENOUS
Status: DISCONTINUED | OUTPATIENT
Start: 2024-05-08 | End: 2024-05-08 | Stop reason: HOSPADM

## 2024-05-07 RX ORDER — ONDANSETRON 2 MG/ML
4 INJECTION INTRAMUSCULAR; INTRAVENOUS EVERY 6 HOURS PRN
Status: DISCONTINUED | OUTPATIENT
Start: 2024-05-07 | End: 2024-05-08 | Stop reason: HOSPADM

## 2024-05-07 RX ORDER — HEPARIN SODIUM 1000 [USP'U]/ML
INJECTION, SOLUTION INTRAVENOUS; SUBCUTANEOUS
Status: DISCONTINUED | OUTPATIENT
Start: 2024-05-07 | End: 2024-05-07 | Stop reason: HOSPADM

## 2024-05-07 RX ORDER — FENTANYL CITRATE 50 UG/ML
INJECTION, SOLUTION INTRAMUSCULAR; INTRAVENOUS
Status: DISCONTINUED | OUTPATIENT
Start: 2024-05-07 | End: 2024-05-07 | Stop reason: HOSPADM

## 2024-05-07 RX ORDER — NITROGLYCERIN 0.4 MG/1
0.8 TABLET SUBLINGUAL
Status: DISCONTINUED | OUTPATIENT
Start: 2024-05-07 | End: 2024-05-07 | Stop reason: HOSPADM

## 2024-05-07 RX ORDER — POLYETHYLENE GLYCOL 3350 17 G/17G
17 POWDER, FOR SOLUTION ORAL DAILY PRN
Status: CANCELLED | OUTPATIENT
Start: 2024-05-07

## 2024-05-07 RX ORDER — SODIUM CHLORIDE 0.9 % (FLUSH) 0.9 %
10 SYRINGE (ML) INJECTION AS NEEDED
Status: CANCELLED | OUTPATIENT
Start: 2024-05-07

## 2024-05-07 RX ORDER — LIDOCAINE HYDROCHLORIDE 10 MG/ML
0.5 INJECTION, SOLUTION INFILTRATION; PERINEURAL ONCE AS NEEDED
Status: DISCONTINUED | OUTPATIENT
Start: 2024-05-07 | End: 2024-05-07 | Stop reason: HOSPADM

## 2024-05-07 RX ORDER — SODIUM CHLORIDE 0.9 % (FLUSH) 0.9 %
10 SYRINGE (ML) INJECTION AS NEEDED
Status: DISCONTINUED | OUTPATIENT
Start: 2024-05-07 | End: 2024-05-07 | Stop reason: HOSPADM

## 2024-05-07 RX ORDER — ROSUVASTATIN CALCIUM 20 MG/1
20 TABLET, COATED ORAL NIGHTLY
Status: DISCONTINUED | OUTPATIENT
Start: 2024-05-07 | End: 2024-05-08 | Stop reason: HOSPADM

## 2024-05-07 RX ORDER — ROSUVASTATIN CALCIUM 20 MG/1
20 TABLET, COATED ORAL NIGHTLY
Status: ON HOLD
Start: 2024-05-07 | End: 2024-05-07

## 2024-05-07 RX ORDER — NITROGLYCERIN 0.4 MG/1
0.4 TABLET SUBLINGUAL
Status: DISCONTINUED | OUTPATIENT
Start: 2024-05-07 | End: 2024-05-07 | Stop reason: HOSPADM

## 2024-05-07 RX ORDER — ASPIRIN 81 MG/1
81 TABLET, CHEWABLE ORAL DAILY
Status: ON HOLD
Start: 2024-05-08 | End: 2024-05-07

## 2024-05-07 RX ORDER — NITROGLYCERIN 0.4 MG/1
0.4 TABLET SUBLINGUAL
Status: CANCELLED | OUTPATIENT
Start: 2024-05-07

## 2024-05-07 RX ORDER — EPTIFIBATIDE 20 MG/10ML
180 INJECTION INTRAVENOUS ONCE
Qty: 10 ML | Refills: 0 | Status: COMPLETED | OUTPATIENT
Start: 2024-05-07 | End: 2024-05-07

## 2024-05-07 RX ORDER — ACETAMINOPHEN 325 MG/1
650 TABLET ORAL EVERY 4 HOURS PRN
Status: CANCELLED | OUTPATIENT
Start: 2024-05-07

## 2024-05-07 RX ORDER — SODIUM CHLORIDE 0.9 % (FLUSH) 0.9 %
10 SYRINGE (ML) INJECTION EVERY 12 HOURS SCHEDULED
Status: DISCONTINUED | OUTPATIENT
Start: 2024-05-07 | End: 2024-05-07 | Stop reason: HOSPADM

## 2024-05-07 RX ORDER — BISACODYL 10 MG
10 SUPPOSITORY, RECTAL RECTAL DAILY PRN
Status: CANCELLED | OUTPATIENT
Start: 2024-05-07

## 2024-05-07 RX ORDER — SODIUM CHLORIDE 9 MG/ML
1 INJECTION, SOLUTION INTRAVENOUS CONTINUOUS
Status: ACTIVE | OUTPATIENT
Start: 2024-05-07 | End: 2024-05-07

## 2024-05-07 RX ORDER — ALUMINA, MAGNESIA, AND SIMETHICONE 2400; 2400; 240 MG/30ML; MG/30ML; MG/30ML
15 SUSPENSION ORAL EVERY 6 HOURS PRN
Status: DISCONTINUED | OUTPATIENT
Start: 2024-05-07 | End: 2024-05-08 | Stop reason: HOSPADM

## 2024-05-07 RX ADMIN — Medication 10 ML: at 09:08

## 2024-05-07 RX ADMIN — ASPIRIN 81 MG: 81 TABLET, CHEWABLE ORAL at 09:08

## 2024-05-07 RX ADMIN — NITROGLYCERIN 0.4 MG: 0.4 TABLET, ORALLY DISINTEGRATING SUBLINGUAL at 01:42

## 2024-05-07 RX ADMIN — TICAGRELOR 90 MG: 90 TABLET ORAL at 21:11

## 2024-05-07 RX ADMIN — ALUMINUM HYDROXIDE, MAGNESIUM HYDROXIDE, AND DIMETHICONE 15 ML: 400; 400; 40 SUSPENSION ORAL at 20:02

## 2024-05-07 RX ADMIN — Medication 10 ML: at 01:38

## 2024-05-07 RX ADMIN — EPTIFIBATIDE 14960 MCG: 2 INJECTION INTRAVENOUS at 22:21

## 2024-05-07 RX ADMIN — SODIUM CHLORIDE 75 ML/HR: 9 INJECTION, SOLUTION INTRAVENOUS at 01:36

## 2024-05-07 RX ADMIN — Medication 10 ML: at 06:37

## 2024-05-07 RX ADMIN — EPTIFIBATIDE 2 MCG/KG/MIN: 75 INJECTION INTRAVENOUS at 22:36

## 2024-05-07 RX ADMIN — SODIUM CHLORIDE 100 ML/HR: 9 INJECTION, SOLUTION INTRAVENOUS at 17:46

## 2024-05-07 RX ADMIN — METOPROLOL TARTRATE 25 MG: 25 TABLET, FILM COATED ORAL at 20:20

## 2024-05-07 RX ADMIN — SODIUM CHLORIDE 500 ML: 9 INJECTION, SOLUTION INTRAVENOUS at 22:58

## 2024-05-07 RX ADMIN — NITROGLYCERIN 5 MCG/MIN: 20 INJECTION INTRAVENOUS at 17:43

## 2024-05-07 RX ADMIN — ROSUVASTATIN CALCIUM 20 MG: 20 TABLET, COATED ORAL at 22:20

## 2024-05-07 NOTE — DISCHARGE SUMMARY
[facility] HOSPITALISTS DISCHARGE SUMMARY    Patient Identification:  Name:  Jaylan Chicas  Age:  63 y.o.  Sex:  male  :  1960  MRN:  [id number]  Visit Number:  [id number]    Date of Admission: 2024  Date of Discharge:  2024     PCP: Provider, No Known    DISCHARGE DIAGNOSIS  NSTEMI Type I  New severe multivessel Coronary Artery Disease  Pre-Diabetes  History Testicular cancer status post radiation    CONSULTS   Consults       Date and Time Order Name Status Description    2024 11:46 PM Inpatient Cardiology Consult            PROCEDURES PERFORMED  Procedure(s) (LRB):  Left Heart Cath (N/A)    HOSPITAL COURSE  63M PMH Testicular cancer status post radiation, presented to [facility] emergency room  with complaints of chest pain with exertion, though also in setting of 1 month history of similar exertional chest pressure, and recent stress of losing his mother.      #NSTEMI Type I, in setting of new severe multivessel Coronary Artery Disease  Labs showed HS Troponins 27->67->407, WBC count 14K, . EKG showed sinus bradycardia, possible left atrial enlargement. CTA Coronaries showed multifocal calcific plaque RCA, non-calcified significant narrowing midportion RCA and high grade stenosis distal RCA, LAD with multifocal calcific plaque with mod stenosis and noncalcified narrowing of proximal LAD with high grade stenosis, multifocal plaque in LCirc causing mild-mod stenosis, total calcium score = 882.  Cardiology consulted and followed, took for [facility] which showed severe multivessel disease, notably high risk lesions LAD and RCA per conversation with Dr. Hodges, formal report to follow.  Recommended transfer to [facility] to discuss high risk PCI vs CABG, Heparin drip on hold, will plan to resume 4-6 hrs post cath, continue Aspirin 81, statin, Cardiology started on new beta blocker.  Patient seen post cath and doing well, no complaints.  Discussed with  family and arranging transport as patient now has a bed.    #Pre-Diabetes  Hgb A1c = 6.2%.  Did not require long acting insulin.    #History Testicular cancer status post radiation    Edited by: Kennedy Mcintyre MD at 5/7/2024 1235    VITAL SIGNS:  Temp:  [98.1 °F (36.7 °C)-98.6 °F (37 °C)] 98.1 °F (36.7 °C)  Heart Rate:  [57-80] 73  Resp:  [11-20] 16  BP: (109-172)/() 148/96  SpO2:  [90 %-99 %] 99 %  on  Flow (L/min):  [2] 2;   Device (Oxygen Therapy): nasal cannula    Body mass index is 26.01 kg/m².  Wt Readings from Last 3 Encounters:   05/07/24 84.6 kg (186 lb 8 oz)     PHYSICAL EXAM:  Constitutional:  Well-developed and well-nourished.  No acute distress.      HENT:  Head:  Normocephalic and atraumatic.  Mouth:  Moist mucous membranes.    Eyes:  Conjunctivae and EOM are normal. No scleral icterus.    Neck:  Neck supple.  No JVD present.    Cardiovascular:  Normal rate, regular rhythm and normal heart sounds with no murmur.  Pulmonary/Chest:  No respiratory distress, no wheezes, no crackles, with normal breath sounds and good air movement.  Abdominal:  Soft. No distension and no tenderness.   Musculoskeletal:  No tenderness, and no deformity.  No red or swollen joints anywhere.    Neurological:  Alert and oriented to person, place, and time.  No gross focal deficits   Skin:  Skin is warm and dry. No rash noted. No pallor.   Peripheral vascular:  No clubbing, no cyanosis, no edema.  Psychiatric: Appropriate mood and affect  Edited by: Kennedy Mcintyre MD at 5/7/2024 1235    DISCHARGE DISPOSITION   Stable    DISCHARGE MEDICATIONS:     Discharge Medications        New Medications        Instructions Start Date   aspirin 81 MG chewable tablet   81 mg, Oral, Daily   Start Date: May 8, 2024     metoprolol tartrate 100 MG tablet  Commonly known as: LOPRESSOR   50 mg, Oral, Once As Needed      rosuvastatin 20 MG tablet  Commonly known as: CRESTOR   20 mg, Oral, Nightly                Follow-up Information        Provider, No Known .    Contact information:  [facility] [place] [address]  [phone]                            TEST  RESULTS PENDING AT DISCHARGE  None     CODE STATUS  Code Status and Medical Interventions:   Ordered at: 05/06/24 2212     Code Status (Patient has no pulse and is not breathing):    CPR (Attempt to Resuscitate)     Medical Interventions (Patient has pulse or is breathing):    Full Support     Kennedy Mcintyre MD  [facility] Hospitalist  05/07/24  12:35 EDT    Please note that this discharge summary required more than 30 minutes to complete.

## 2024-05-07 NOTE — Clinical Note
Prepped: left groin and Right Wrist. Prepped with: ChloraPrep. The site was clipped. The patient was draped in a sterile fashion.

## 2024-05-07 NOTE — Clinical Note
First balloon inflation max pressure = 15 grazyna. First balloon inflation duration = 15 seconds. Second inflation of balloon - Max pressure = 15 grazyna. 2nd Inflation of balloon - Duration = 10 seconds.

## 2024-05-07 NOTE — PLAN OF CARE
"Maternal History:  The mother is a 27 y.o.    with an Estimated Date of Delivery: 23 . She  has a past medical history of Asthma and Diabetes mellitus, type 2.     Prenatal Labs Review: ABO/Rh:   Lab Results   Component Value Date/Time    GROUPTRH B POS 2022 06:20 AM      Group B Beta Strep: No results found for: STREPBCULT   HIV:   HIV 1/2   Date Value Ref Range Status   2022 Non-Reactive Non-Reactive Final      RPR: No results found for: RPR   Hepatitis B Surface Antigen:   Lab Results   Component Value Date/Time    HEPBSAG Non-Reactive 2022 06:20 AM      Rubella Immune Status: No results found for: RUBELLAIMMUN     Delivery Information:  Infant delivered on 2022 at 3:56 PM by Vaginal, Spontaneous. indicated. Anesthesia pgars were Apgars: 1Min.: 8 5 Min.: 9 10 Min.:  . Amniotic fluid amount moderate ; color Clear .  Intervention/Resuscitation:  DR Condition: DR Treatment:     Scheduled Meds:    erythromycin   Both Eyes Once    phytonadione vitamin k  1 mg Intramuscular Once     Continuous Infusions:   PRN Meds:     Nutritional Support:     Objective:     Vital Signs (Most Recent):    Vital Signs (24h Range):        Anthropometrics:      Weight: 2806 g (6 lb 3 oz) (Filed from Delivery Summary) 49 %ile (Z= -0.03) based on [place] (Boys, 22-50 Weeks) weight-for-age data using vitals from 2022.  Height: 50.8 cm (20") (Filed from Delivery Summary) 90 %ile (Z= 1.30) based on [place] (Boys, 22-50 Weeks) Length-for-age data based on Length recorded on 2022.     Physical Exam  Constitutional:       General: He is active.      Appearance: Normal appearance. He is well-developed.   HENT:      Head: Normocephalic and atraumatic. Anterior fontanelle is flat.      Right Ear: External ear normal.      Left Ear: External ear normal.      Nose: Nose normal.      Mouth/Throat:      Mouth: Mucous membranes are moist.      Pharynx: Oropharynx is clear.   Eyes:      General: Red reflex is " Goal Outcome Evaluation:  Plan of Care Reviewed With: patient, spouse        Progress: no change  Outcome Evaluation: Pt resting calmly in bed with wife at bedside. Heparin drip going at 12/unit/kg. Fluids running per order. On RA. NSR. VSS. NPO this shift due to having heart cath in the morning. No new needs noted at this time.         Problem: Adult Inpatient Plan of Care  Goal: Plan of Care Review  Outcome: Ongoing, Progressing  Flowsheets (Taken 5/7/2024 0412)  Progress: no change  Plan of Care Reviewed With:   patient   spouse  Outcome Evaluation: Pt resting calmly in bed with wife at bedside. Heparin drip going at 12/unit/kg. Fluids running per order. On RA. NSR. VSS. NPO this shift due to having heart cath in the morning. No new needs noted at this time.  Goal: Patient-Specific Goal (Individualized)  Outcome: Ongoing, Progressing  Goal: Absence of Hospital-Acquired Illness or Injury  Outcome: Ongoing, Progressing  Intervention: Identify and Manage Fall Risk  Recent Flowsheet Documentation  Taken 5/7/2024 0300 by Lisa Cope, RN  Safety Promotion/Fall Prevention:   safety round/check completed   room organization consistent   nonskid shoes/slippers when out of bed   lighting adjusted   fall prevention program maintained   clutter free environment maintained   assistive device/personal items within reach   activity supervised  Taken 5/7/2024 0100 by Lisa Cope, RN  Safety Promotion/Fall Prevention:   safety round/check completed   room organization consistent   nonskid shoes/slippers when out of bed   lighting adjusted   fall prevention program maintained   clutter free environment maintained   assistive device/personal items within reach   activity supervised  Taken 5/6/2024 2350 by Lisa Cope, RN  Safety Promotion/Fall Prevention:   safety round/check completed   room organization consistent   nonskid shoes/slippers when out of bed   lighting adjusted   fall prevention program maintained    clutter free environment maintained   assistive device/personal items within reach   activity supervised  Intervention: Prevent Skin Injury  Recent Flowsheet Documentation  Taken 5/7/2024 0200 by Lisa Cope RN  Skin Protection:   adhesive use limited   pulse oximeter probe site changed   tubing/devices free from skin contact   transparent dressing maintained  Taken 5/6/2024 2350 by Lisa Cope RN  Skin Protection:   adhesive use limited   pulse oximeter probe site changed   tubing/devices free from skin contact   transparent dressing maintained  Intervention: Prevent and Manage VTE (Venous Thromboembolism) Risk  Recent Flowsheet Documentation  Taken 5/7/2024 0200 by Lisa Cope RN  VTE Prevention/Management: (see MAR) other (see comments)  Range of Motion: active ROM (range of motion) encouraged  Taken 5/6/2024 2350 by Lisa Cope RN  VTE Prevention/Management: (see MAR) other (see comments)  Range of Motion: active ROM (range of motion) encouraged  Intervention: Prevent Infection  Recent Flowsheet Documentation  Taken 5/7/2024 0300 by Lisa Cope RN  Infection Prevention:   hand hygiene promoted   rest/sleep promoted   single patient room provided  Taken 5/7/2024 0100 by Lisa Cope RN  Infection Prevention:   hand hygiene promoted   rest/sleep promoted   single patient room provided  Taken 5/6/2024 2350 by Lisa Cope RN  Infection Prevention:   hand hygiene promoted   rest/sleep promoted   single patient room provided  Goal: Optimal Comfort and Wellbeing  Outcome: Ongoing, Progressing  Intervention: Monitor Pain and Promote Comfort  Recent Flowsheet Documentation  Taken 5/7/2024 0200 by Lisa Cope RN  Pain Management Interventions: see MAR  Intervention: Provide Person-Centered Care  Recent Flowsheet Documentation  Taken 5/7/2024 0200 by Lisa Cope RN  Trust Relationship/Rapport:   care explained   choices provided   thoughts/feelings acknowledged    present bilaterally.      Pupils: Pupils are equal, round, and reactive to light.   Cardiovascular:      Rate and Rhythm: Normal rate and regular rhythm.      Pulses: Normal pulses.      Heart sounds: No murmur heard.  Pulmonary:      Effort: Pulmonary effort is normal. No respiratory distress, nasal flaring or retractions.      Breath sounds: Normal breath sounds.   Abdominal:      General: Bowel sounds are normal. There is no distension.      Palpations: Abdomen is soft. There is no mass.      Tenderness: There is no abdominal tenderness.   Genitourinary:     Penis: Normal.       Testes: Normal.   Musculoskeletal:         General: Normal range of motion.      Cervical back: Normal range of motion.      Right hip: Negative right Ortolani and negative right Ibrahim.      Left hip: Negative left Ortolani and negative left Ibrahim.   Skin:     General: Skin is warm.      Capillary Refill: Capillary refill takes less than 2 seconds.      Turgor: Normal.   Neurological:      General: No focal deficit present.      Mental Status: He is alert.      Primitive Reflexes: Suck normal. Symmetric [place].       Laboratory:      Diagnostic Results:     reassurance provided  Taken 5/6/2024 2350 by Lisa Cope RN  Trust Relationship/Rapport:   care explained   choices provided   thoughts/feelings acknowledged   reassurance provided  Goal: Readiness for Transition of Care  Outcome: Ongoing, Progressing     Problem: Chest Pain  Goal: Resolution of Chest Pain Symptoms  Outcome: Ongoing, Progressing  Goal: Resolution of Chest Pain Symptoms  Outcome: Ongoing, Progressing     Problem: Fall Injury Risk  Goal: Absence of Fall and Fall-Related Injury  Outcome: Ongoing, Progressing  Intervention: Promote Injury-Free Environment  Recent Flowsheet Documentation  Taken 5/7/2024 0300 by Lisa Cope RN  Safety Promotion/Fall Prevention:   safety round/check completed   room organization consistent   nonskid shoes/slippers when out of bed   lighting adjusted   fall prevention program maintained   clutter free environment maintained   assistive device/personal items within reach   activity supervised  Taken 5/7/2024 0100 by Lisa Cope RN  Safety Promotion/Fall Prevention:   safety round/check completed   room organization consistent   nonskid shoes/slippers when out of bed   lighting adjusted   fall prevention program maintained   clutter free environment maintained   assistive device/personal items within reach   activity supervised  Taken 5/6/2024 2350 by Lisa Cope RN  Safety Promotion/Fall Prevention:   safety round/check completed   room organization consistent   nonskid shoes/slippers when out of bed   lighting adjusted   fall prevention program maintained   clutter free environment maintained   assistive device/personal items within reach   activity supervised     Problem: Arrhythmia/Dysrhythmia (Cardiac Catheterization)  Goal: Stable Heart Rate and Rhythm  Outcome: Ongoing, Progressing     Problem: Bleeding (Cardiac Catheterization)  Goal: Absence of Bleeding  Outcome: Ongoing, Progressing     Problem: Contrast-Induced Injury Risk (Cardiac  Catheterization)  Goal: Absence of Contrast-Induced Injury  Outcome: Ongoing, Progressing     Problem: Embolism (Cardiac Catheterization)  Goal: Absence of Embolism Signs and Symptoms  Outcome: Ongoing, Progressing  Intervention: Prevent or Manage Embolism  Recent Flowsheet Documentation  Taken 5/7/2024 0200 by Lisa Cope RN  VTE Prevention/Management: (see MAR) other (see comments)  Taken 5/6/2024 2350 by Lisa Cope RN  VTE Prevention/Management: (see MAR) other (see comments)     Problem: Ongoing Anesthesia/Sedation Effects (Cardiac Catheterization)  Goal: Anesthesia/Sedation Recovery  Outcome: Ongoing, Progressing  Intervention: Optimize Anesthesia Recovery  Recent Flowsheet Documentation  Taken 5/7/2024 0300 by Lisa Cope RN  Safety Promotion/Fall Prevention:   safety round/check completed   room organization consistent   nonskid shoes/slippers when out of bed   lighting adjusted   fall prevention program maintained   clutter free environment maintained   assistive device/personal items within reach   activity supervised  Taken 5/7/2024 0100 by Lisa Cope RN  Safety Promotion/Fall Prevention:   safety round/check completed   room organization consistent   nonskid shoes/slippers when out of bed   lighting adjusted   fall prevention program maintained   clutter free environment maintained   assistive device/personal items within reach   activity supervised  Taken 5/6/2024 2350 by Lisa Cope RN  Safety Promotion/Fall Prevention:   safety round/check completed   room organization consistent   nonskid shoes/slippers when out of bed   lighting adjusted   fall prevention program maintained   clutter free environment maintained   assistive device/personal items within reach   activity supervised     Problem: Pain (Cardiac Catheterization)  Goal: Acceptable Pain Control  Outcome: Ongoing, Progressing  Intervention: Prevent or Manage Pain  Recent Flowsheet Documentation  Taken 5/7/2024  0200 by Lisa Cope, RN  Pain Management Interventions: see MAR  Diversional Activities:   television   smartphone  Taken 5/6/2024 2350 by Lisa Cope, RN  Diversional Activities:   television   smartphone     Problem: Vascular Access Protection (Cardiac Catheterization)  Goal: Absence of Vascular Access Complication  Outcome: Ongoing, Progressing

## 2024-05-07 NOTE — Clinical Note
First balloon inflation max pressure = 12 grazyna. First balloon inflation duration = 15 seconds. Second inflation of balloon - Max pressure = 12 grazyna. 2nd Inflation of balloon - Duration = 10 seconds.

## 2024-05-07 NOTE — H&P
Hospitalist History and Physical        Patient Identification  Name: Jaylan Chicas  Age/Sex: 63 y.o. male  :  1960        MRN: [id number]  Visit Number: [id number]  Admit Date: 2024   PCP: Provider, No Known          Chief complaint chest pain    History of Present Illness:  Patient is a 63 y.o. 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 He reports chest pressure radiating to his neck. He denies associated dyspnea or nausea. The pain started to ease up with rest and then subsided about an hour after onset. Upon further questioning, patient noted experiencing some mild chest pressure over the last month while jogging which is new, but he attributed this to being out of shape and just starting to consistently jog again. Patient lives in [place] but was in town for his mother's . He was scheduled to fly back tomorrow.      In the ED, initial EKG read as ST elevation, but ST segments were concave in shape and felt to represent early repolarization rather than true STEMI. Initial troponin was normal and other labs were unremarkable. Repeat troponin alec from 18 to 27, however, and third alec to 67. Repeat EKG showed sinus bradycardia but was otherwise felt to be unremarkable. Cardiology recommended cardiac CT angiogram which showed multifocal calific plaque in the RCA with high-grade noncalcified stenosis in the distal RCA, multifocal calcific plaque in the LAD causing moderate stenosis in general but noncalcified high-grade stenosis in the proximal LAD, and mild to moderate calcified stenosis in the left circumflex. Cardiology recommended admission for treatment of NSTEMI with plans for left heart cath in the morning.     Review of Systems  Review of Systems   Constitutional:  Negative for activity change, appetite change,  chills, diaphoresis, fatigue, fever and unexpected weight change.   HENT:  Negative for congestion, postnasal drip, rhinorrhea, sinus pressure, sinus pain and sore throat.    Eyes:  Negative for photophobia and visual disturbance.   Respiratory:  Negative for cough, shortness of breath and wheezing.    Cardiovascular:  Positive for chest pain. Negative for palpitations and leg swelling.   Gastrointestinal:  Negative for abdominal distention, abdominal pain, constipation, diarrhea, nausea and vomiting.   Genitourinary:  Negative for difficulty urinating, dysuria, flank pain, frequency and hematuria.   Musculoskeletal:  Negative for arthralgias, back pain, joint swelling and myalgias.   Skin:  Negative for color change, pallor, rash and wound.   Neurological:  Negative for dizziness, tremors, seizures, weakness, light-headedness, numbness and headaches.   Hematological:  Negative for adenopathy. Does not bruise/bleed easily.   Psychiatric/Behavioral:  Negative for agitation, behavioral problems and confusion.        History  Medical-  testicular cancer s/p radiation  No past surgical history on file.  No family history on file.     (Not in a hospital admission)    Allergies:  Patient has no known allergies.    Objective     Vital Signs  Temp:  [98.6 °F (37 °C)] 98.6 °F (37 °C)  Heart Rate:  [57-80] 73  Resp:  [20] 20  BP: (130-172)/() 149/80  Body mass index is 24.41 kg/m².    Physical Exam:  Physical Exam  Constitutional:       General: He is not in acute distress.     Appearance: Normal appearance. He is not ill-appearing.   HENT:      Head: Normocephalic and atraumatic.      Right Ear: External ear normal.      Left Ear: External ear normal.      Nose: Nose normal.      Mouth/Throat:      Mouth: Mucous membranes are moist.      Pharynx: Oropharynx is clear.   Eyes:      Extraocular Movements: Extraocular movements intact.      Conjunctiva/sclera: Conjunctivae normal.      Pupils: Pupils are equal, round, and  reactive to light.   Cardiovascular:      Rate and Rhythm: Normal rate and regular rhythm.      Pulses: Normal pulses.      Heart sounds: Normal heart sounds. No murmur heard.  Pulmonary:      Effort: Pulmonary effort is normal. No respiratory distress.      Breath sounds: Normal breath sounds. No wheezing or rales.   Abdominal:      General: Abdomen is flat. Bowel sounds are normal. There is no distension.      Palpations: Abdomen is soft.      Tenderness: There is no abdominal tenderness.   Musculoskeletal:         General: Normal range of motion.      Cervical back: Normal range of motion and neck supple. No tenderness.      Right lower leg: No edema.      Left lower leg: No edema.   Lymphadenopathy:      Cervical: No cervical adenopathy.   Skin:     General: Skin is warm and dry.      Capillary Refill: Capillary refill takes less than 2 seconds.   Neurological:      General: No focal deficit present.      Mental Status: He is alert and oriented to person, place, and time.   Psychiatric:         Mood and Affect: Mood normal.         Behavior: Behavior normal.           Results Review:       Lab Results:  Results from last 7 days   Lab Units 05/06/24  1419   WBC 10*3/mm3 9.56   HEMOGLOBIN g/dL 14.8   PLATELETS 10*3/mm3 238         Results from last 7 days   Lab Units 05/06/24  1419   SODIUM mmol/L 139   POTASSIUM mmol/L 4.1   CHLORIDE mmol/L 100   CO2 mmol/L 28.6   BUN mg/dL 16   CREATININE mg/dL 1.16   CALCIUM mg/dL 9.6   GLUCOSE mg/dL 86         Hemoglobin A1C   Date Value Ref Range Status   05/06/2024 6.20 (H) 4.80 - 5.60 % Final     Results from last 7 days   Lab Units 05/06/24  1419   BILIRUBIN mg/dL 0.4   ALK PHOS U/L 97   AST (SGOT) U/L 18   ALT (SGPT) U/L 16     Results from last 7 days   Lab Units 05/06/24  1852 05/06/24  1552 05/06/24  1419   CK TOTAL U/L  --   --  90   HSTROP T ng/L 67* 27* 18         Results from last 7 days   Lab Units 05/06/24  1419   INR  0.93           I have reviewed the patient's  laboratory results.    Imaging:  Imaging Results (Last 72 Hours)       Procedure Component Value Units Date/Time    CT Angiogram Heart With 3D Image [[id number]] Collected: 05/06/24 2131     Updated: 05/06/24 2146    Narrative:      EXAM:    CT Angiography Heart With Intravenous Contrast     EXAM DATE:    5/6/2024 5:48 PM     CLINICAL HISTORY:    pain     TECHNIQUE:    Axial computed tomographic angiography images of the coronary arteries  with intravenous contrast.  Sublingual nitroglycerine for coronary  vasodilation and IV metoprolol to reduce heart rate were administered as  needed.  This CT exam was performed using one or more of the following  dose reduction techniques:  automated exposure control, adjustment of  the mA and/or kV according to patient size, and/or use of iterative  reconstruction technique.    3D and MIP reconstructed images were created and reviewed.     COMPARISON:    None available     FINDINGS:    LEFT MAIN CORONARY ARTERY:  Unremarkable.  The left main coronary  artery bifurcates in LAD and LCX.  It is patent with no evidence of  plaque or stenosis.    LEFT ANTERIOR DESCENDING ARTERY:  Left anterior descending artery  demonstrating multifocal calcific plaque causing moderate stenosis but  noncalcified narrowing in the proximal LAD causing high-grade stenosis.   LAD calcium score 150.    LEFT CIRCUMFLEX ARTERY:  Multifocal calcific plaque in the circumflex  artery causing mild to moderate stenosis.  Circumflex calcium score 94.    RIGHT CORONARY ARTERY:  Multifocal calcific plaque in the right  coronary artery. Noncalcified significant narrowing in the midportion of  the right coronary artery and high-grade stenosis in the distal right  coronary artery.  RCA calcium score 629.       CARDIAC VALVES:  Tricuspid aortic valve without measurable calcific  plaque.    PERICARDIUM:  Unremarkable.  Contour is preserved with no effusion,  thickening or calcification.        AORTA:  No acute findings.   No thoracic aortic aneurysm.  No  dissection.    PULMONARY ARTERIES:  Unremarkable.  No pulmonary embolism.    LUNGS AND PLEURAL SPACES:  Unremarkable as visualized.  No mass.  No  consolidation or edema.  No pleural effusion or thickening.  No  pneumothorax.    MEDIASTINUM:  Unremarkable.  No mass.    OTHER FINDINGS:  Total calcium score 882.       Impression:      1.  Multifocal calcific plaque in the right coronary artery.  Noncalcified significant narrowing in the midportion of the right  coronary artery and high-grade stenosis in the distal right coronary  artery.  2.  Left anterior descending artery demonstrating multifocal calcific  plaque causing moderate stenosis but noncalcified narrowing in the  proximal LAD causing high-grade stenosis.  3.  Multifocal calcific plaque in the circumflex artery causing mild to  moderate stenosis.  4.  Total calcium score 882.  5.  Tricuspid aortic valve without measurable calcific plaque.        Evaluation:  CAD RADS 4/P4     This report was finalized on 2024 9:44 PM by Dr. Bj Donnelly MD.       XR Chest 1 View [[id number]] Collected: 24 1501     Updated: 24 1503    Narrative:      XR CHEST 1 VW-     CLINICAL INDICATION: Chest Pain Triage Protocol        COMPARISON: None immediately available      TECHNIQUE: Single frontal view of the chest.     FINDINGS:     LUNGS: Lungs are adequately aerated.      HEART AND MEDIASTINUM: Heart and mediastinal contours are unremarkable        SKELETON: Bony and soft tissue structures are unremarkable.             Impression:      No radiographic evidence of acute cardiac or pulmonary disease.           This report was finalized on 2024 3:01 PM by Dr. Bj Donnelly MD.               I have personally reviewed the patient's radiologic imaging.        EKst-  Normal sinus rhythm with sinus arrhythmia, HR 70, QTc 386  ST elevation, consider early repolarization, pericarditis, or injury  Abnormal ECG  No previous ECGs  available  Confirmed by Cole Barrera ([id number]) on 5/6/2024 6:26:27 PM    2nd-  Sinus bradycardia, HR 55, QTc 382  Possible Left atrial enlargement  Borderline ECG  When compared with ECG of 06-MAY-2024 13:52,  No significant change was found    I have personally reviewed the patient's EKG.        Assessment & Plan     - NSTEMI (non-ST elevated myocardial infarction): Received full dose ASA as well as crestor in the ED and started on IV heparin infusion. Admit to PCU and continue IV heparin, low dose ASA, and crestor. Keep NPO except sips with meds after midnight. Obtain ECHO in the morning. Anticipate left heart cath in the morning per cardiology. Await further recommendations in the morning. No cardiac risk factors identified at this time; A1c in pre-diabetic range, fasting lipid panel pending for further risk stratification.  - Pre-diabetes: based on A1c 6.2. Recommend lifestyle modification.     DVT Prophylaxis: anticoagulated on IV heparin infusion    Estimated Length of Stay >2 midnights    I discussed the patient's findings, assessment and plan with the patient, his family and friends at bedside, and ED provider Dr Da Silva.    Patient is high risk due to NSTEMI    Gopal Cohen MD  05/06/24  23:15 EDT

## 2024-05-07 NOTE — Clinical Note
First balloon inflation max pressure = 15 grazyna. First balloon inflation duration = 20 seconds. Second inflation of balloon - Max pressure = 10 grazyna. 2nd Inflation of balloon - Duration = 5 seconds.

## 2024-05-07 NOTE — Clinical Note
Hemostasis started on the right radial artery. R-Band was used in achieving hemostasis. Radial compression device applied to vessel. Hemostasis achieved successfully. Closure device additional comment: 12cc in the band

## 2024-05-07 NOTE — PROGRESS NOTES
[facility] HOSPITALIST PROGRESS NOTE     Patient Identification:  Name:  Jaylan Chicas  Age:  63 y.o.  Sex:  male  :  1960  MRN:  [id number]  Visit Number:  [id number]  ROOM: [place] [address]     Primary Care Provider:  Provider, No Known    Length of stay in inpatient status:  1    Subjective     Chief Compliant:    Chief Complaint   Patient presents with    Chest Pain     History of Presenting Illness:    Patient remains ill but stable today, no acute events overnight, no new complaints, denies any fevers or chills, chest pain/pressure improved, on Heparin drip, HS Troponins now up to 400's, discussed with Cardiology, plan for [facility] today, patient remains NPO, formal echocardiogram ordered and pending, discussed risk factors, disease process, CTA results, potential cath outcomes with patient at bedside this AM.   Objective     Current Hospital Meds:  aspirin, 81 mg, Oral, Daily  melatonin, 10 mg, Oral, Once  nitroglycerin, 0.4 mg, Sublingual, Once in imaging   Or  nitroglycerin, 0.8 mg, Sublingual, Once in imaging  rosuvastatin, 20 mg, Oral, Nightly  heparin, 12 Units/kg/hr, Last Rate: 12 Units/kg/hr (24)  Pharmacy to Dose Heparin,   sodium chloride, 75 mL/hr, Last Rate: 75 mL/hr (24)    ----------------------------------------------------------------------------------------------------------------------  Vital Signs:  Temp:  [98.5 °F (36.9 °C)-98.6 °F (37 °C)] 98.5 °F (36.9 °C)  Heart Rate:  [57-80] 77  Resp:  [11-20] 14  BP: (109-172)/() 120/68  SpO2:  [90 %-98 %] 95 %  on   ;   Device (Oxygen Therapy): room air  Body mass index is 26.01 kg/m².      Intake/Output Summary (Last 24 hours) at 2024  Last data filed at 2024 06  Gross per 24 hour   Intake 451.69 ml   Output 400 ml   Net 51.69 ml      ----------------------------------------------------------------------------------------------------------------------  Physical exam:  Constitutional:  Well-developed  "and well-nourished.  No acute distress.      HENT:  Head:  Normocephalic and atraumatic.  Mouth:  Moist mucous membranes.    Eyes:  Conjunctivae and EOM are normal. No scleral icterus.    Neck:  Neck supple.  No JVD present.    Cardiovascular:  Normal rate, regular rhythm and normal heart sounds with no murmur.  Pulmonary/Chest:  No respiratory distress, no wheezes, no crackles, with normal breath sounds and good air movement.  Abdominal:  Soft. No distension and no tenderness.   Musculoskeletal:  No tenderness, and no deformity.  No red or swollen joints anywhere.    Neurological:  Alert and oriented to person, place, and time.  No cranial nerve deficit.    Skin:  Skin is warm and dry. No rash noted. No pallor.   Peripheral vascular:  No clubbing, no cyanosis, no edema.  Psychiatric: Appropriate mood and affect  Edited by: Kennedy Mcintyre MD at 5/7/2024 0918  ----------------------------------------------------------------------------------------------------------------------  WBC/HGB/HCT/PLT   14.71/14.0/42.5/193 (05/07 0419)  BUN/CREAT/GLUC/ALT/AST/ALETHEA/LIP    15/1.04/96/16/27/--/-- (05/07 0419)  LYTES - Na/K/Cl/CO2: 137/4.1/103/27.8 (05/07 0419)  COAG - PT/INR/PTT: --/--/132.0* (05/06 2212)     No results found for: \"URINECX\"  No results found for: \"BLOODCX\"    I have personally looked at the labs and they are summarized above.  ----------------------------------------------------------------------------------------------------------------------  Detailed radiology reports for the last 24 hours:  CT Angiogram Heart With 3D Image    Result Date: 5/6/2024  1.  Multifocal calcific plaque in the right coronary artery. Noncalcified significant narrowing in the midportion of the right coronary artery and high-grade stenosis in the distal right coronary artery. 2.  Left anterior descending artery demonstrating multifocal calcific plaque causing moderate stenosis but noncalcified narrowing in the proximal LAD causing " high-grade stenosis. 3.  Multifocal calcific plaque in the circumflex artery causing mild to moderate stenosis. 4.  Total calcium score 882. 5.  Tricuspid aortic valve without measurable calcific plaque.   Evaluation: CAD RADS 4/P4  This report was finalized on 5/6/2024 9:44 PM by Dr. Bj Donnelly MD.      XR Chest 1 View    Result Date: 5/6/2024  No radiographic evidence of acute cardiac or pulmonary disease.    This report was finalized on 5/6/2024 3:01 PM by Dr. Bj Donnelly MD.     Assessment & Plan    63M PMH Testicular cancer status post radiation, presented to [facility] emergency room 5/6 with complaints of chest pain with exertion, though also in setting of 1 month history of similar exertional chest pressure, and recent stress of losing his mother.      #NSTEMI, suspected Type I, in setting of new Coronary Artery Disease noted on CTA   - Labs showed HS Troponins 27->67->407, WBC count 14K,   - EKG showed sinus bradycardia, possible left atrial enlargement   - CTA Coronaries showed multifocal calcific plaque RCA, non-calcified significant narrowing midportion RCA and high grade stenosis distal RCA, LAD with multifocal calcific plaque with mod stenosis and noncalcified narrowing of proximal LAD with high grade stenosis, multifocal plaque in LCirc causing mild-mod stenosis, total calcium score = 882  - Cardiology consulted and following, plan for [facility], discussed with team today   - Status post Aspirin 325mg, status post SLN x 1 in emergency room   - Continue home Aspirin 81, Statin  - Continue Heparin drip for now  - Monitor on telemetry, strict I/O's, daily weights, trend heart rate and blood pressure    #Pre-Diabetes  - Hgb A1c = 6.2%  - Holding home oral agents, continue FSBG and SSI, add long acting as indicated.     #History Testicular cancer status post radiation    F: NPO   E: Monitor & Replace as needed   N: NPO Diet NPO Type: Strict NPO  PPx: Heparin gtt  Code Status (Patient has  no pulse and is not breathing): CPR  Medical Interventions (Patient has pulse or is breathing): Full Support     Dispo: Pending workup and clinical improvement    *This patient is considered high risk secondary to NSTEMI type I.     Edited by: Kennedy Mcintyre MD at 5/7/2024 0904  [facility]

## 2024-05-07 NOTE — Clinical Note
First balloon inflation max pressure = 12 grazyna. First balloon inflation duration = 15 seconds. Second inflation of balloon - Max pressure = 14 grazyna. 2nd Inflation of balloon - Duration = 10 seconds.

## 2024-05-07 NOTE — CASE MANAGEMENT/SOCIAL WORK
Discharge Planning Assessment   Fly     Patient Name: Jaylan Chicas  MRN: [id number]  Today's Date: 5/7/2024    Admit Date: 5/6/2024     Discharge Plan       Row Name 05/07/24 1305       Plan    Final Discharge Disposition Code 02 - short term hospital for  care    Final Note Pt is being discharged to [facility] on this date. No other needs identified.             JOVON Reeves

## 2024-05-07 NOTE — NURSING NOTE
[facility] ambulance service was given report and transferred out via stretcher to ambulance.

## 2024-05-07 NOTE — NURSING NOTE
[facility] for multi vessel occlusion. Transportation set up by [facility] and cath lab reports they will call receiving facility with required information.

## 2024-05-07 NOTE — Clinical Note
First balloon inflation max pressure = 8 grazyna. First balloon inflation duration = 15 seconds. Second inflation of balloon - Max pressure = 10 grazyna. 2nd Inflation of balloon - Duration = 15 seconds.

## 2024-05-07 NOTE — H&P
Date of Hospital Visit: 24  Encounter Provider: Allie Jaime PA-C  Place of Service: [facility]  Patient Name: Jaylan Chicas  :1960  Referral Provider: Camden Da Silva*  Primary Care Provider: Provider, No Known    Chief complaint: ACS/ Coronary artery disease    History of Present Illness:  Patient is a 63-year-old male who has a past medical history significant for testicular cancer status post radiation and prediabetes presented to [facility] on  with complaints of chest pain with exertion x 1 month.  Was found with elevated troponin trending up, had abnormal coronary CTA.  Cardiology was consulted and he underwent left heart catheterization which revealed severe multivessel CAD with lesions in the LAD and the RCA.  He was transferred to [facility] here at [facility] for higher level of care and staged interventions of LAD and RCA.      No past medical history on file.    No past surgical history on file.    Medications Prior to Admission   Medication Sig Dispense Refill Last Dose    [START ON 2024] aspirin 81 MG chewable tablet Chew 1 tablet Daily.       metoprolol tartrate (LOPRESSOR) 100 MG tablet Take 0.5 tablets by mouth 1 (One) Time As Needed (HR Greater Than 75 & Systolic BP Greater Than 120) for up to 1 dose.       rosuvastatin (CRESTOR) 20 MG tablet Take 1 tablet by mouth Every Night.          Social History     Socioeconomic History    Marital status:    Tobacco Use    Smoking status: Never    Smokeless tobacco: Never   Vaping Use    Vaping status: Never Used   Substance and Sexual Activity    Alcohol use: Never    Drug use: Never    Sexual activity: Defer       No family history on file.       Objective:   There were no vitals filed for this visit.  There is no height or weight on file to calculate BMI.      Physical Exam   General:  well developed and well nourished.    Neck: Supple, no JVD.    Chest:No respiratory distress. No chest  wall tenderness. Breath sounds are normal. No wheezes, rhonchi or rales.  Cardiovascular: Normal S1 and S2, no murmur, gallop or rub. PMI is not displaced.    Pulses:Radial and pedal pulses are 2+ and symmetric.    Abdomen: Soft, nontender, normal bowel sounds.   Musculoskeletal/Extremities:  No clubbing, cyanosis or edema. No gross deformity.     Lab Review:                Results from last 7 days   Lab Units 05/07/24  0419   SODIUM mmol/L 137   POTASSIUM mmol/L 4.1   CHLORIDE mmol/L 103   CO2 mmol/L 27.8   BUN mg/dL 15   CREATININE mg/dL 1.04   GLUCOSE mg/dL 96   CALCIUM mg/dL 9.1     Results from last 7 days   Lab Units 05/07/24  0728 05/06/24  1852 05/06/24  1552 05/06/24  1419   CK TOTAL U/L  --   --   --  90   HSTROP T ng/L 407* 67* 27* 18     Results from last 7 days   Lab Units 05/07/24  0419   WBC 10*3/mm3 14.71*   HEMOGLOBIN g/dL 14.0   HEMATOCRIT % 42.5   PLATELETS 10*3/mm3 193     Results from last 7 days   Lab Units 05/06/24  2212 05/06/24  1419   INR   --  0.93   APTT seconds 132.0*  --          Results from last 7 days   Lab Units 05/07/24  0419   CHOLESTEROL mg/dL 194   TRIGLYCERIDES mg/dL 119   HDL CHOL mg/dL 43   LDL CHOL mg/dL 129*     Imaging Results (Last 24 Hours)       ** No results found for the last 24 hours. **             EKG: sinus bradycardia with nonspecific ST-T changes       Assessment:   Coronary artery disease, [facility] at OSH with LAD and RCA stenosis  Dyslipidemia, , not previously on medication  Hypertension, not on home medication  Prediabetes  History of testicular cancer with radiation    Plan:   We will proceed with cardiac catheterization/staged intervention of LAD and RCA.  Risks, benefits, and alternatives discussed with the patient and his wife and they wish to proceed. We will proceed via right radial.  Continue aspirin and statin. Has not been started on plavix in case patient requires surgical intervention.  Will obtain echocardiogram to assess LV and valvular  function.   Further recommendations to follow cardiac catheterization.    Allie Jaime PA-C

## 2024-05-07 NOTE — Clinical Note
First balloon inflation max pressure = 12 grazyna. First balloon inflation duration = 15 seconds. Second inflation of balloon - Max pressure = 15 grazyna. 2nd Inflation of balloon - Duration = 15 seconds.

## 2024-05-07 NOTE — CONSULTS
"Consults  Date of Admit: 5/6/2024  Date of Consult: 05/07/24  No ref. provider found  Jaylan Chicas  1960    Consulting Physician: Florentin Hodges MD    Cardiology consultation    Reason for consultation:  NSTEMI      Chest Pain   Pertinent negatives include no diaphoresis, nausea, palpitations, shortness of breath, vomiting or weakness.       Subjective     Chief Complaint   Patient presents with    Chest Pain       Jaylan Chicas is a 63 y.o. male with history of testicular cancer and no other chronic illnesses.    He is an extremely active male who jogs for exercise occasionally.  While working outside doing [employer] work 05/06/2024 he began to have chest pressure that radiated into his neck.  He did not have shortness of breath, diaphoresis, or nausea.  He reports that he had noticed decreased exercise tolerance over approximately the last month, but no other episodes that would have made him complained of chest pain.    At approximately 0200 this morning he had \"heart heaviness\" that was alleviated by nitroglycerin.  He is currently chest pain-free and without other complaint.    Presented to our emergency room 05/06/2024.    Admission labs and studies significant for high-sensitivity troponin 12-61-.  A1c 6.2.  . he underwent coronary CTA which identified moderate to high-grade stenosis in the LAD, mild to moderate stenosis in the LCx, and high-grade stenosis in the distal RCA.  Total calcium score 882.  EKG revealed sinus rhythm with PACs and ST depression in leads III and aVF.  This was thought to be early repolarization.      Cardiac risk factors:hypercholesterolemia    Last Echo: Ordered-not yet completed      Last Coronary CTA:   CT Angiogram Heart With 3D Image 05/06/2024    Narrative  EXAM:  CT Angiography Heart With Intravenous Contrast    EXAM DATE:  5/6/2024 5:48 PM    CLINICAL HISTORY:  pain    TECHNIQUE:  Axial computed tomographic angiography images of the coronary arteries  with " intravenous contrast.  Sublingual nitroglycerine for coronary  vasodilation and IV metoprolol to reduce heart rate were administered as  needed.  This CT exam was performed using one or more of the following  dose reduction techniques:  automated exposure control, adjustment of  the mA and/or kV according to patient size, and/or use of iterative  reconstruction technique.  3D and MIP reconstructed images were created and reviewed.    COMPARISON:  None available    FINDINGS:  LEFT MAIN CORONARY ARTERY:  Unremarkable.  The left main coronary  artery bifurcates in LAD and LCX.  It is patent with no evidence of  plaque or stenosis.  LEFT ANTERIOR DESCENDING ARTERY:  Left anterior descending artery  demonstrating multifocal calcific plaque causing moderate stenosis but  noncalcified narrowing in the proximal LAD causing high-grade stenosis.  LAD calcium score 150.  LEFT CIRCUMFLEX ARTERY:  Multifocal calcific plaque in the circumflex  artery causing mild to moderate stenosis.  Circumflex calcium score 94.  RIGHT CORONARY ARTERY:  Multifocal calcific plaque in the right  coronary artery. Noncalcified significant narrowing in the midportion of  the right coronary artery and high-grade stenosis in the distal right  coronary artery.  RCA calcium score 629.    CARDIAC VALVES:  Tricuspid aortic valve without measurable calcific  plaque.  PERICARDIUM:  Unremarkable.  Contour is preserved with no effusion,  thickening or calcification.    AORTA:  No acute findings.  No thoracic aortic aneurysm.  No  dissection.  PULMONARY ARTERIES:  Unremarkable.  No pulmonary embolism.  LUNGS AND PLEURAL SPACES:  Unremarkable as visualized.  No mass.  No  consolidation or edema.  No pleural effusion or thickening.  No  pneumothorax.  MEDIASTINUM:  Unremarkable.  No mass.  OTHER FINDINGS:  Total calcium score 882.    Impression  1.  Multifocal calcific plaque in the right coronary artery.  Noncalcified significant narrowing in the midportion of  the right  coronary artery and high-grade stenosis in the distal right coronary  artery.  2.  Left anterior descending artery demonstrating multifocal calcific  plaque causing moderate stenosis but noncalcified narrowing in the  proximal LAD causing high-grade stenosis.  3.  Multifocal calcific plaque in the circumflex artery causing mild to  moderate stenosis.  4.  Total calcium score 882.  5.  Tricuspid aortic valve without measurable calcific plaque.      Evaluation:  CAD RADS 4/P4    This report was finalized on 5/6/2024 9:44 PM by Dr. Bj Donnelly MD.            History reviewed. No pertinent past medical history.  History reviewed. No pertinent surgical history.  History reviewed. No pertinent family history.  Social History     Tobacco Use    Smoking status: Never    Smokeless tobacco: Never   Vaping Use    Vaping status: Never Used   Substance Use Topics    Alcohol use: Never    Drug use: Never     No medications prior to admission.     Allergies:  Patient has no known allergies.    Review of Systems   Constitutional:  Negative for diaphoresis and fatigue.   Respiratory:  Negative for shortness of breath.    Cardiovascular:  Positive for chest pain. Negative for palpitations and leg swelling.   Gastrointestinal:  Negative for nausea and vomiting.   Neurological:  Negative for weakness.          Objective      Vital Signs  Temp:  [98.5 °F (36.9 °C)-98.6 °F (37 °C)] 98.5 °F (36.9 °C)  Heart Rate:  [57-80] 77  Resp:  [11-20] 18  BP: (109-172)/() 146/85  Body mass index is 26.01 kg/m².    Intake/Output Summary (Last 24 hours) at 5/7/2024 0933  Last data filed at 5/7/2024 0637  Gross per 24 hour   Intake 451.69 ml   Output 400 ml   Net 51.69 ml       Physical Exam  Vitals and nursing note reviewed.   Constitutional:       General: He is not in acute distress.  HENT:      Head: Normocephalic and atraumatic.   Eyes:      Conjunctiva/sclera: Conjunctivae normal.   Neck:      Vascular: No carotid bruit.  "  Cardiovascular:      Rate and Rhythm: Normal rate and regular rhythm.      Pulses: Normal pulses.   Pulmonary:      Effort: Pulmonary effort is normal.      Breath sounds: Normal breath sounds.   Musculoskeletal:      Cervical back: Neck supple.      Right lower leg: No edema.      Left lower leg: No edema.   Skin:     General: Skin is warm and dry.   Neurological:      General: No focal deficit present.   Psychiatric:         Mood and Affect: Mood normal.         Behavior: Behavior normal.         Telemetry: Sinus 70s    Results Review:   I reviewed the patient's new clinical results.  Results from last 7 days   Lab Units 05/07/24  0728 05/06/24  1852 05/06/24  1552 05/06/24  1419   CK TOTAL U/L  --   --   --  90   HSTROP T ng/L 407* 67* 27* 18     Results from last 7 days   Lab Units 05/07/24  0419 05/06/24  1419   WBC 10*3/mm3 14.71* 9.56   HEMOGLOBIN g/dL 14.0 14.8   PLATELETS 10*3/mm3 193 238     Results from last 7 days   Lab Units 05/07/24  0419 05/06/24  1419   SODIUM mmol/L 137 139   POTASSIUM mmol/L 4.1 4.1   CHLORIDE mmol/L 103 100   CO2 mmol/L 27.8 28.6   BUN mg/dL 15 16   CREATININE mg/dL 1.04 1.16   CALCIUM mg/dL 9.1 9.6   GLUCOSE mg/dL 96 86   ALT (SGPT) U/L 16 16   AST (SGOT) U/L 27 18     Lab Results   Component Value Date    INR 0.93 05/06/2024     No results found for: \"MG\"  Lab Results   Component Value Date    CHLPL 177 03/15/2024    TRIG 119 05/07/2024    HDL 43 05/07/2024     (H) 05/07/2024      No results found for: \"PROBNP\"     EKG:     Imaging Results (Last 72 Hours)       Procedure Component Value Units Date/Time    CT Angiogram Heart With 3D Image [[id number]] Collected: 05/06/24 2131     Updated: 05/06/24 2146    Narrative:      EXAM:    CT Angiography Heart With Intravenous Contrast     EXAM DATE:    5/6/2024 5:48 PM     CLINICAL HISTORY:    pain     TECHNIQUE:    Axial computed tomographic angiography images of the coronary arteries  with intravenous contrast.  Sublingual " nitroglycerine for coronary  vasodilation and IV metoprolol to reduce heart rate were administered as  needed.  This CT exam was performed using one or more of the following  dose reduction techniques:  automated exposure control, adjustment of  the mA and/or kV according to patient size, and/or use of iterative  reconstruction technique.    3D and MIP reconstructed images were created and reviewed.     COMPARISON:    None available     FINDINGS:    LEFT MAIN CORONARY ARTERY:  Unremarkable.  The left main coronary  artery bifurcates in LAD and LCX.  It is patent with no evidence of  plaque or stenosis.    LEFT ANTERIOR DESCENDING ARTERY:  Left anterior descending artery  demonstrating multifocal calcific plaque causing moderate stenosis but  noncalcified narrowing in the proximal LAD causing high-grade stenosis.   LAD calcium score 150.    LEFT CIRCUMFLEX ARTERY:  Multifocal calcific plaque in the circumflex  artery causing mild to moderate stenosis.  Circumflex calcium score 94.    RIGHT CORONARY ARTERY:  Multifocal calcific plaque in the right  coronary artery. Noncalcified significant narrowing in the midportion of  the right coronary artery and high-grade stenosis in the distal right  coronary artery.  RCA calcium score 629.       CARDIAC VALVES:  Tricuspid aortic valve without measurable calcific  plaque.    PERICARDIUM:  Unremarkable.  Contour is preserved with no effusion,  thickening or calcification.        AORTA:  No acute findings.  No thoracic aortic aneurysm.  No  dissection.    PULMONARY ARTERIES:  Unremarkable.  No pulmonary embolism.    LUNGS AND PLEURAL SPACES:  Unremarkable as visualized.  No mass.  No  consolidation or edema.  No pleural effusion or thickening.  No  pneumothorax.    MEDIASTINUM:  Unremarkable.  No mass.    OTHER FINDINGS:  Total calcium score 882.       Impression:      1.  Multifocal calcific plaque in the right coronary artery.  Noncalcified significant narrowing in the  midportion of the right  coronary artery and high-grade stenosis in the distal right coronary  artery.  2.  Left anterior descending artery demonstrating multifocal calcific  plaque causing moderate stenosis but noncalcified narrowing in the  proximal LAD causing high-grade stenosis.  3.  Multifocal calcific plaque in the circumflex artery causing mild to  moderate stenosis.  4.  Total calcium score 882.  5.  Tricuspid aortic valve without measurable calcific plaque.        Evaluation:  CAD RADS 4/P4     This report was finalized on 5/6/2024 9:44 PM by Dr. Bj Donnelly MD.       XR Chest 1 View [[id number]] Collected: 05/06/24 1501     Updated: 05/06/24 1503    Narrative:      XR CHEST 1 VW-     CLINICAL INDICATION: Chest Pain Triage Protocol        COMPARISON: None immediately available      TECHNIQUE: Single frontal view of the chest.     FINDINGS:     LUNGS: Lungs are adequately aerated.      HEART AND MEDIASTINUM: Heart and mediastinal contours are unremarkable        SKELETON: Bony and soft tissue structures are unremarkable.             Impression:      No radiographic evidence of acute cardiac or pulmonary disease.           This report was finalized on 5/6/2024 3:01 PM by Dr. Bj Donnelly MD.                Assessment:  1.  NSTEMI likely type I  2.  ACS  3.  Hyperlipidemia        Plan:  1.  Plan to proceed with invasive coronary angiogram today.  2.  Continue heparin drip until procedure complete.  GDMT postprocedure  3.  High intensity statin has been initiated.    Risks and benefits of the procedure discussed with the patient.  Risks specifically mentioned included bruising/hematoma, bleeding, infection, allergic reaction to medications, renal injury, dysrhythmia, blood clot, heart attack, and stroke.    Case discussed with Dr. Hodges and plan of care reflects his recommendations.      Thank you very much for asking us to be involved in this patient's care.  We will follow along with  you.      Electronically signed by ARABELLA Madera, 05/07/24, 9:56 AM EDT.    Patient seen and examined.  Chart reviewed.  Patient seen in the Cath Lab prior to cath.  On exam patient does not have any wheezing or crackles, regular rate rhythm, normal S1-S2  Sensory and motor system are intact  #1 cardiac.  Patient with non-Q wave MI with acute coronary syndrome.  Left heart cardiac catheterization planned today.  IV heparin on initial presentation.  Lipid-lowering medication will be initiated.  .Electronically signed by Florentin Hodges MD, 05/07/24, 6:09 PM EDT.

## 2024-05-07 NOTE — PAYOR COMM NOTE
"[facility]  NPI:[id number]    Utilization Review  Contact: Mikayla Li RN  Phone: [phone]  Fax:[phone]    INITIATE INPATIENT AUTHORIZATION  Jaylan Chew (63 y.o. Male)       Date of Birth   1960    Social Security Number       Address   [address] [place] [address]    Home Phone   [phone]    MRN   [id number]       Worship   None    Marital Status                               Admission Date   24    Admission Type   Emergency    Admitting Provider   Gopal Cohen MD    Attending Provider   Gopal Cohen MD    Department, Room/Bed   [facility] PROGRESS CARE, P203/S2       Discharge Date       Discharge Disposition       Discharge Destination                                 Attending Provider: Gopal Cohen MD    Allergies: No Known Allergies    Isolation: None   Infection: None   Code Status: CPR    Ht: 180.3 cm (71\")   Wt: 84.6 kg (186 lb 8 oz)    Admission Cmt: None   Principal Problem: NSTEMI (non-ST elevated myocardial infarction) [I21.4]                   Active Insurance as of 2024       Primary Coverage       Payor Plan Insurance Group Employer/Plan Group    [employer]CIAL AETNA [id number]       Payor Plan Address Payor Plan Phone Number Payor Plan Fax Number Effective Dates    [address] [phone]  2022 - None Entered    [facility] [id number]         Subscriber Name Subscriber Birth Date Member ID       JAYLAN CHEW 1960 [id number]                     Emergency Contacts        (Rel.) Home Phone Work Phone Mobile Phone    MARK CHEW (Spouse) [phone] -- --                 History & Physical        Gopal Cohen MD at 24 2315          Hospitalist History and Physical        Patient Identification  Name: Jaylan Chew  Age/Sex: 63 y.o. male  :  1960        MRN: [id number]  Visit Number: [id number]  Admit Date: 2024   PCP: Provider, No " Known          Chief complaint chest pain    History of Present Illness:  Patient is a 63 y.o. 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 He reports chest pressure radiating to his neck. He denies associated dyspnea or nausea. The pain started to ease up with rest and then subsided about an hour after onset. Upon further questioning, patient noted experiencing some mild chest pressure over the last month while jogging which is new, but he attributed this to being out of shape and just starting to consistently jog again. Patient lives in [place] but was in town for his mother's . He was scheduled to fly back tomorrow.      In the ED, initial EKG read as ST elevation, but ST segments were concave in shape and felt to represent early repolarization rather than true STEMI. Initial troponin was normal and other labs were unremarkable. Repeat troponin alec from 18 to 27, however, and third alec to 67. Repeat EKG showed sinus bradycardia but was otherwise felt to be unremarkable. Cardiology recommended cardiac CT angiogram which showed multifocal calific plaque in the RCA with high-grade noncalcified stenosis in the distal RCA, multifocal calcific plaque in the LAD causing moderate stenosis in general but noncalcified high-grade stenosis in the proximal LAD, and mild to moderate calcified stenosis in the left circumflex. Cardiology recommended admission for treatment of NSTEMI with plans for left heart cath in the morning.     Review of Systems  Review of Systems   Constitutional:  Negative for activity change, appetite change, chills, diaphoresis, fatigue, fever and unexpected weight change.   HENT:  Negative for congestion, postnasal drip, rhinorrhea, sinus pressure, sinus pain and sore throat.    Eyes:  Negative for photophobia and visual  disturbance.   Respiratory:  Negative for cough, shortness of breath and wheezing.    Cardiovascular:  Positive for chest pain. Negative for palpitations and leg swelling.   Gastrointestinal:  Negative for abdominal distention, abdominal pain, constipation, diarrhea, nausea and vomiting.   Genitourinary:  Negative for difficulty urinating, dysuria, flank pain, frequency and hematuria.   Musculoskeletal:  Negative for arthralgias, back pain, joint swelling and myalgias.   Skin:  Negative for color change, pallor, rash and wound.   Neurological:  Negative for dizziness, tremors, seizures, weakness, light-headedness, numbness and headaches.   Hematological:  Negative for adenopathy. Does not bruise/bleed easily.   Psychiatric/Behavioral:  Negative for agitation, behavioral problems and confusion.        History  Medical-  testicular cancer s/p radiation  No past surgical history on file.  No family history on file.     (Not in a hospital admission)    Allergies:  Patient has no known allergies.    Objective    Vital Signs  Temp:  [98.6 °F (37 °C)] 98.6 °F (37 °C)  Heart Rate:  [57-80] 73  Resp:  [20] 20  BP: (130-172)/() 149/80  Body mass index is 24.41 kg/m².    Physical Exam:  Physical Exam  Constitutional:       General: He is not in acute distress.     Appearance: Normal appearance. He is not ill-appearing.   HENT:      Head: Normocephalic and atraumatic.      Right Ear: External ear normal.      Left Ear: External ear normal.      Nose: Nose normal.      Mouth/Throat:      Mouth: Mucous membranes are moist.      Pharynx: Oropharynx is clear.   Eyes:      Extraocular Movements: Extraocular movements intact.      Conjunctiva/sclera: Conjunctivae normal.      Pupils: Pupils are equal, round, and reactive to light.   Cardiovascular:      Rate and Rhythm: Normal rate and regular rhythm.      Pulses: Normal pulses.      Heart sounds: Normal heart sounds. No murmur heard.  Pulmonary:      Effort: Pulmonary effort  is normal. No respiratory distress.      Breath sounds: Normal breath sounds. No wheezing or rales.   Abdominal:      General: Abdomen is flat. Bowel sounds are normal. There is no distension.      Palpations: Abdomen is soft.      Tenderness: There is no abdominal tenderness.   Musculoskeletal:         General: Normal range of motion.      Cervical back: Normal range of motion and neck supple. No tenderness.      Right lower leg: No edema.      Left lower leg: No edema.   Lymphadenopathy:      Cervical: No cervical adenopathy.   Skin:     General: Skin is warm and dry.      Capillary Refill: Capillary refill takes less than 2 seconds.   Neurological:      General: No focal deficit present.      Mental Status: He is alert and oriented to person, place, and time.   Psychiatric:         Mood and Affect: Mood normal.         Behavior: Behavior normal.           Results Review:       Lab Results:  Results from last 7 days   Lab Units 05/06/24  1419   WBC 10*3/mm3 9.56   HEMOGLOBIN g/dL 14.8   PLATELETS 10*3/mm3 238         Results from last 7 days   Lab Units 05/06/24  1419   SODIUM mmol/L 139   POTASSIUM mmol/L 4.1   CHLORIDE mmol/L 100   CO2 mmol/L 28.6   BUN mg/dL 16   CREATININE mg/dL 1.16   CALCIUM mg/dL 9.6   GLUCOSE mg/dL 86         Hemoglobin A1C   Date Value Ref Range Status   05/06/2024 6.20 (H) 4.80 - 5.60 % Final     Results from last 7 days   Lab Units 05/06/24  1419   BILIRUBIN mg/dL 0.4   ALK PHOS U/L 97   AST (SGOT) U/L 18   ALT (SGPT) U/L 16     Results from last 7 days   Lab Units 05/06/24  1852 05/06/24  1552 05/06/24  1419   CK TOTAL U/L  --   --  90   HSTROP T ng/L 67* 27* 18         Results from last 7 days   Lab Units 05/06/24  1419   INR  0.93           I have reviewed the patient's laboratory results.    Imaging:  Imaging Results (Last 72 Hours)       Procedure Component Value Units Date/Time    CT Angiogram Heart With 3D Image [[id number]] Collected: 05/06/24 2131     Updated: 05/06/24 2146     Narrative:      EXAM:    CT Angiography Heart With Intravenous Contrast     EXAM DATE:    5/6/2024 5:48 PM     CLINICAL HISTORY:    pain     TECHNIQUE:    Axial computed tomographic angiography images of the coronary arteries  with intravenous contrast.  Sublingual nitroglycerine for coronary  vasodilation and IV metoprolol to reduce heart rate were administered as  needed.  This CT exam was performed using one or more of the following  dose reduction techniques:  automated exposure control, adjustment of  the mA and/or kV according to patient size, and/or use of iterative  reconstruction technique.    3D and MIP reconstructed images were created and reviewed.     COMPARISON:    None available     FINDINGS:    LEFT MAIN CORONARY ARTERY:  Unremarkable.  The left main coronary  artery bifurcates in LAD and LCX.  It is patent with no evidence of  plaque or stenosis.    LEFT ANTERIOR DESCENDING ARTERY:  Left anterior descending artery  demonstrating multifocal calcific plaque causing moderate stenosis but  noncalcified narrowing in the proximal LAD causing high-grade stenosis.   LAD calcium score 150.    LEFT CIRCUMFLEX ARTERY:  Multifocal calcific plaque in the circumflex  artery causing mild to moderate stenosis.  Circumflex calcium score 94.    RIGHT CORONARY ARTERY:  Multifocal calcific plaque in the right  coronary artery. Noncalcified significant narrowing in the midportion of  the right coronary artery and high-grade stenosis in the distal right  coronary artery.  RCA calcium score 629.       CARDIAC VALVES:  Tricuspid aortic valve without measurable calcific  plaque.    PERICARDIUM:  Unremarkable.  Contour is preserved with no effusion,  thickening or calcification.        AORTA:  No acute findings.  No thoracic aortic aneurysm.  No  dissection.    PULMONARY ARTERIES:  Unremarkable.  No pulmonary embolism.    LUNGS AND PLEURAL SPACES:  Unremarkable as visualized.  No mass.  No  consolidation or edema.  No  pleural effusion or thickening.  No  pneumothorax.    MEDIASTINUM:  Unremarkable.  No mass.    OTHER FINDINGS:  Total calcium score 882.       Impression:      1.  Multifocal calcific plaque in the right coronary artery.  Noncalcified significant narrowing in the midportion of the right  coronary artery and high-grade stenosis in the distal right coronary  artery.  2.  Left anterior descending artery demonstrating multifocal calcific  plaque causing moderate stenosis but noncalcified narrowing in the  proximal LAD causing high-grade stenosis.  3.  Multifocal calcific plaque in the circumflex artery causing mild to  moderate stenosis.  4.  Total calcium score 882.  5.  Tricuspid aortic valve without measurable calcific plaque.        Evaluation:  CAD RADS 4/P4     This report was finalized on 2024 9:44 PM by Dr. Bj Donnelly MD.       XR Chest 1 View [[id number]] Collected: 24 1501     Updated: 24 1503    Narrative:      XR CHEST 1 VW-     CLINICAL INDICATION: Chest Pain Triage Protocol        COMPARISON: None immediately available      TECHNIQUE: Single frontal view of the chest.     FINDINGS:     LUNGS: Lungs are adequately aerated.      HEART AND MEDIASTINUM: Heart and mediastinal contours are unremarkable        SKELETON: Bony and soft tissue structures are unremarkable.             Impression:      No radiographic evidence of acute cardiac or pulmonary disease.           This report was finalized on 2024 3:01 PM by Dr. Bj Donnelly MD.               I have personally reviewed the patient's radiologic imaging.        EKst-  Normal sinus rhythm with sinus arrhythmia, HR 70, QTc 386  ST elevation, consider early repolarization, pericarditis, or injury  Abnormal ECG  No previous ECGs available  Confirmed by Cole Barrera ([id number]) on 2024 6:26:27 PM    2nd-  Sinus bradycardia, HR 55, QTc 382  Possible Left atrial enlargement  Borderline ECG  When compared with ECG of 06-MAY-2024  13:52,  No significant change was found    I have personally reviewed the patient's EKG.        Assessment & Plan    - NSTEMI (non-ST elevated myocardial infarction): Received full dose ASA as well as crestor in the ED and started on IV heparin infusion. Admit to PCU and continue IV heparin, low dose ASA, and crestor. Keep NPO except sips with meds after midnight. Obtain ECHO in the morning. Anticipate left heart cath in the morning per cardiology. Await further recommendations in the morning. No cardiac risk factors identified at this time; A1c in pre-diabetic range, fasting lipid panel pending for further risk stratification.  - Pre-diabetes: based on A1c 6.2. Recommend lifestyle modification.     DVT Prophylaxis: anticoagulated on IV heparin infusion    Estimated Length of Stay >2 midnights    I discussed the patient's findings, assessment and plan with the patient, his family and friends at bedside, and ED provider Dr Da Silva.    Patient is high risk due to NSTEMI    Gopal Cohen MD  05/06/24  23:15 EDT      Electronically signed by Gopal Cohen MD at 05/06/24 2334          Emergency Department Notes        Manuela Guido RN at 05/06/24 2305          Report called to NORBERT CARRASQUILLO on PCU    Electronically signed by Manuela Guido RN at 05/06/24 2305       Dimas Givens PCT at 05/06/24 2142          Dr. Hodges contacted per Dr. Da Silva at this time.     Electronically signed by Dimas Givens PCT at 05/06/24 2143       Dimas Givens PCT at 05/06/24 2140          Dr. Barrera contacted per Dr. Da Silva at this time.     Electronically signed by Dimas Givens PCT at 05/06/24 2141       Camden Da Silva DO at 05/06/24 1544          Subjective   History of Present Illness  Patient is a 63-year-old male who comes to the ER with complaints of chest pain.  Chest pain started around 1 PM today.  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  He says it was like an elephant and heavy.  It radiated to his bilateral neck.  At the worst it was a 3/10.  It did not get worse with exertion.  Did not get worse with rest.  He said it was continuous for about an hour and did not ease up.  He \"spit up \"but did not feel nauseous.  He thought maybe he was dehydrated as well.  He does note he has been having intermittent sharp pains during his weekly jogs but has not thought much of it and try to shake it off.  He also tried to push through it today but could not due to the persistence of pain.  He took a Tylenol, did not have any nitroglycerin and came to the ER for evaluation.    At the time of my exam, patient denies any significant chest pain, rates it a 1/10.  Received a full dose aspirin on arrival.      Review of Systems   Constitutional:  Negative for chills, fatigue and fever.   HENT:  Negative for congestion, sinus pain and sore throat.    Respiratory:  Negative for cough, chest tightness, shortness of breath and wheezing.    Cardiovascular:  Positive for chest pain. Negative for palpitations and leg swelling.   Gastrointestinal:  Negative for abdominal pain, constipation, diarrhea, nausea and vomiting.   Genitourinary:  Negative for dysuria, frequency, hematuria and urgency.   Musculoskeletal:  Negative for arthralgias and myalgias.   Neurological:  Negative for dizziness, numbness and headaches.   Psychiatric/Behavioral:  Negative for confusion.        No past medical history on file.    No Known Allergies    No past surgical history on file.    No family history on file.    Social History     Socioeconomic History    Marital status:            Objective   Physical Exam  Vitals and nursing note reviewed.   Constitutional:       General: He is not in acute distress.     Appearance: He is well-developed and normal weight. He is not ill-appearing.   HENT:      Head: Normocephalic.      Mouth/Throat: "      Mouth: Mucous membranes are moist.      Pharynx: Oropharynx is clear.   Eyes:      Extraocular Movements: Extraocular movements intact.      Pupils: Pupils are equal, round, and reactive to light.   Neck:      Vascular: No JVD.   Cardiovascular:      Rate and Rhythm: Normal rate and regular rhythm.      Heart sounds: No murmur heard.     No friction rub. No gallop.   Pulmonary:      Effort: Pulmonary effort is normal. No tachypnea.      Breath sounds: Normal breath sounds. No decreased breath sounds, wheezing, rhonchi or rales.   Chest:      Chest wall: No tenderness.   Abdominal:      General: Bowel sounds are normal.      Palpations: Abdomen is soft.   Musculoskeletal:         General: Normal range of motion.      Cervical back: Normal range of motion and neck supple.      Right lower leg: No edema.      Left lower leg: No edema.   Skin:     General: Skin is warm and dry.      Capillary Refill: Capillary refill takes less than 2 seconds.   Neurological:      General: No focal deficit present.      Mental Status: He is alert and oriented to person, place, and time.   Psychiatric:         Mood and Affect: Mood normal.         Behavior: Behavior normal.         Procedures          ED Course  ED Course as of 05/06/24 2210   Mon May 06, 2024   1413 EKG notes sinus rhythm.  70 bpm.  No acute ST elevation.  Possible early repolarization noted.  70 bpm.  QTc 386.  Electronically signed by Jorge A Alarcon DO, 05/06/24, 2:14 PM EDT.   [SF]   1843 ECG 12 Lead Chest Pain  Sinus bradycardia, rate 55, QTc 382, no acute ST or T wave changes [CW]      ED Course User Index  [CW] Camden Da Silva DO  [SF] Jorge A Alarcon DO                HEART Score: 5                              Medical Decision Making  --He describes crescendo like chest pain and had exertional chest pain today, EKG no significant ischemic changes  --Initial troponin 18, repeat 27, 67  --Repeat EKG kwaku w/out ischemic changes  --d/w cards, rec  CTA coronary--> high-grade hemodynamically significant stenosis of the LAD/RCA with recs for heart cath  --Discussed with interventional cardiology, n.p.o. at midnight, tentative plans for heart cath tomorrow   --discussed with medicine, will admit    Problems Addressed:  NSTEMI (non-ST elevated myocardial infarction): complicated acute illness or injury    Amount and/or Complexity of Data Reviewed  Labs: ordered.  Radiology: ordered.  ECG/medicine tests: ordered. Decision-making details documented in ED Course.    Risk  OTC drugs.  Prescription drug management.        Final diagnoses:   NSTEMI (non-ST elevated myocardial infarction)       ED Disposition  ED Disposition       ED Disposition   Decision to Admit    Condition   --    Comment   --               No follow-up provider specified.       Medication List      No changes were made to your prescriptions during this visit.            Camden Da Silva DO  05/06/24 2210      Electronically signed by Camden Da Silva DO at 05/06/24 2210       Devika Jeong at 05/06/24 1354          EKG completed @ 1352. EKG given to Dr Alarcon @ 1353    Electronically signed by Devika Jeong at 05/06/24 1355       Facility-Administered Medications as of 5/7/2024   Medication Dose Route Frequency Provider Last Rate Last Admin    aspirin chewable tablet 81 mg  81 mg Oral Daily Gopal Cohen MD        [COMPLETED] aspirin tablet 325 mg  325 mg Oral Once Camden Da Silva DO   325 mg at 05/06/24 1503    sennosides-docusate (PERICOLACE) 8.6-50 MG per tablet 2 tablet  2 tablet Oral BID PRN Gopal Cohen MD        And    polyethylene glycol (MIRALAX) packet 17 g  17 g Oral Daily PRN Gopal Cohen MD        And    bisacodyl (DULCOLAX) EC tablet 5 mg  5 mg Oral Daily PRN Gopal Cohen MD        And    bisacodyl (DULCOLAX) suppository 10 mg  10 mg Rectal Daily PRN Gopal Cohen MD        heparin (porcine)  5000 UNIT/ML injection 2,000 Units  25 Units/kg Intravenous PRN Gopal Cohen MD        [COMPLETED] heparin (porcine) 5000 UNIT/ML injection 4,000 Units  4,000 Units Intravenous Once Camden Da Silva DO   4,000 Units at 05/06/24 2201    heparin (porcine) 5000 UNIT/ML injection 4,000 Units  50 Units/kg Intravenous PRN Gopal Cohen MD        heparin 85161 units/250 mL (100 units/mL) in 0.45 % NaCl infusion  12 Units/kg/hr Intravenous Titrated Gopal Cohen MD 9.52 mL/hr at 05/07/24 0136 12 Units/kg/hr at 05/07/24 0136    [COMPLETED] iopamidol (ISOVUE-370) 76 % injection 100 mL  100 mL Intravenous Once in imaging Camden Da Silva DO   100 mL at 05/06/24 1814    lidocaine (XYLOCAINE) 1 % injection 0.5 mL  0.5 mL Intradermal Once PRN Camden Da Silva DO        melatonin tablet 10 mg  10 mg Oral Once oGpal Cohen MD        metoprolol tartrate (LOPRESSOR) tablet 100 mg  100 mg Oral Once PRN Camden Da Silva DO        [COMPLETED] metoprolol tartrate (LOPRESSOR) tablet 50 mg  50 mg Oral Once PRN Camden Da Silva DO   50 mg at 05/06/24 1659    nitroglycerin (NITROSTAT) SL tablet 0.4 mg  0.4 mg Sublingual Once in imaging Camden Da Silva DO        Or    nitroglycerin (NITROSTAT) SL tablet 0.8 mg  0.8 mg Sublingual Once in imaging Camden Da Silva DO        [COMPLETED] nitroglycerin (NITROSTAT) SL tablet 0.4 mg  0.4 mg Sublingual Once Camden Da Silva DO   0.4 mg at 05/06/24 1805    nitroglycerin (NITROSTAT) SL tablet 0.4 mg  0.4 mg Sublingual Q5 Min PRN Gopal Cohen MD   0.4 mg at 05/07/24 0142    Pharmacy to Dose Heparin   Does not apply Continuous PRN Gopal Cohen MD        [COMPLETED] rosuvastatin (CRESTOR) tablet 20 mg  20 mg Oral Once Camden Da Silva DO   20 mg at 05/06/24 2204    rosuvastatin (CRESTOR) tablet 20 mg  20 mg Oral Nightly Gopal Cohen MD        sodium chloride  0.9 % flush 10 mL  10 mL Intravenous PRN Gopal Cohen MD        sodium chloride 0.9 % flush 10 mL  10 mL Intravenous Q12H Camden Da Silva,    10 mL at 05/07/24 0138    sodium chloride 0.9 % flush 10 mL  10 mL Intravenous PRN Camden Da Silva,         sodium chloride 0.9 % flush 10 mL  10 mL Intravenous Q12H Gopal Cohen MD   10 mL at 05/07/24 0138    sodium chloride 0.9 % flush 10 mL  10 mL Intravenous PRN Gopal Cohen MD        sodium chloride 0.9 % flush 10 mL  10 mL Intravenous Q12H Gopal Cohen MD        sodium chloride 0.9 % flush 10 mL  10 mL Intravenous PRN Gopal Cohen MD        sodium chloride 0.9 % infusion 40 mL  40 mL Intravenous PRN Camden Da Silva,         sodium chloride 0.9 % infusion 40 mL  40 mL Intravenous PRN Gopal Cohen MD        sodium chloride 0.9 % infusion 40 mL  40 mL Intravenous PRN Gopal Cohen MD        sodium chloride 0.9 % infusion  75 mL/hr Intravenous Continuous Gopal Cohen MD 75 mL/hr at 05/07/24 0136 75 mL/hr at 05/07/24 0136     Orders (all)        Start     Ordered    05/07/24 2100  rosuvastatin (CRESTOR) tablet 20 mg  Nightly         05/06/24 2210 05/07/24 1100  Heparin Anti-Xa  Timed         05/07/24 0501    05/07/24 0900  aspirin chewable tablet 81 mg  Daily         05/06/24 2210 05/07/24 0800  Oral Care  2 Times Daily       05/06/24 2346 05/07/24 0700  Adult Transthoracic Echo Complete w/ Color, Spectral and Contrast if necessary per protocol  Once         05/06/24 2346    05/07/24 0600  CBC Auto Differential  Morning Draw         05/06/24 2346 05/07/24 0600  Comprehensive Metabolic Panel  Morning Draw         05/06/24 2346    05/07/24 0600  Lipid Panel  Morning Draw         05/06/24 2346    05/07/24 0400  Heparin Anti-Xa  Timed         05/06/24 2324    05/07/24 0245  sodium chloride 0.9 % flush 10 mL  Every 12 Hours Scheduled         05/07/24 0144     "05/07/24 0146  Connectors / Hubs Must Be Scrubbed 15 Seconds Using 70% Alcohol Before Access - Allow to Dry Before Accessing Line  Continuous         05/07/24 0145    05/07/24 0145  sodium chloride 0.9 % flush 10 mL  As Needed         05/07/24 0145    05/07/24 0145  sodium chloride 0.9 % infusion 40 mL  As Needed         05/07/24 0145    05/07/24 0115  nitroglycerin (NITROSTAT) SL tablet 0.4 mg  Once in Imaging        Placed in \"Or\" Linked Group    05/07/24 0016    05/07/24 0115  nitroglycerin (NITROSTAT) SL tablet 0.8 mg  Once in Imaging        Placed in \"Or\" Linked Group    05/07/24 0016    05/07/24 0115  sodium chloride 0.9 % flush 10 mL  Every 12 Hours Scheduled         05/07/24 0016    05/07/24 0045  sodium chloride 0.9 % flush 10 mL  Every 12 Hours Scheduled         05/06/24 2346    05/07/24 0045  sodium chloride 0.9 % infusion  Continuous         05/06/24 2346    05/07/24 0017  Obtain Informed Consent - Computed Tomography Angiography of Chest - Angiogram of Coronary Arteries  Once         05/07/24 0016    05/07/24 0017  Vital Signs Upon Arrival  Once         05/07/24 0016    05/07/24 0017  Cardiac Monitoring  Continuous        Comments: Follow Standing Orders As Outlined in Process Instructions (Open Order Report to View Full Instructions)    05/07/24 0016    05/07/24 0017  Notify CT After Administration of metoprolol tartrate (LOPRESSOR) tablet  Once         05/07/24 0016    05/07/24 0017  No Caffeine or Nicotine 4 Hours Prior to CTA  Once         05/07/24 0016    05/07/24 0017  Verify NPO Status - Patient to Be NPO at Least 4 Hours Prior to CTA  Once         05/07/24 0016    05/07/24 0017  NPO Diet NPO Type: Strict NPO  Diet Effective Now         05/07/24 0016    05/07/24 0017  Notify Provider If Total Metoprolol Given Equals 300mg & Heart Rate Not At Goal  Continuous        Comments: Open Order Report to View Parameters Requiring Provider Notification    05/07/24 0016    05/07/24 0017  Notify Provider " Prior to Administration of Nitroglycerin if Patient SBP <80  Continuous        Comments: Open Order Report to View Parameters Requiring Provider Notification    05/07/24 0016    05/07/24 0017  Insert Peripheral IV  Once         05/07/24 0016    05/07/24 0017  Saline Lock & Maintain IV Access  Continuous         05/07/24 0016 05/07/24 0017  Vital Signs - See Instructions  Per Order Details        Comments: Oral Metoprolol (LOPRESSOR): VS on Return to Floor    IV Metoprolol (LOPRESSOR): VS Every 15 Minutes x2 From Last Administered Dose    05/07/24 0016    05/07/24 0017  Hold Medication Metformin (Glucophage, Glucophage XR, Fortament, Glumetza);  Metglip (metformin/glipizide);  Glucovance (metformin/glyburide); Avandamet (metformin/rosiglitazone)  Per Order Details        Comments: Do Not Administer Medications Listed for 48 Hours After Contrast Administered    05/07/24 0016    05/07/24 0017  Patient May Discharge Home After Procedure Complete (If Stable)  Once        Comments: Stable - Patient Vitals Have Returned to Baseline, Patient Without Complaint of Chest Pain or Symptoms of Reaction to Contrast  If There is Concern for Dizziness Post Procedure, Assure Patient Does Not Drive, Verify Transportation Via Another Responsible Adult    05/07/24 0016    05/07/24 0016  metoprolol tartrate (LOPRESSOR) tablet 100 mg  Once As Needed         05/07/24 0016    05/07/24 0016  sodium chloride 0.9 % flush 10 mL  As Needed         05/07/24 0016    05/07/24 0016  sodium chloride 0.9 % infusion 40 mL  As Needed         05/07/24 0016    05/07/24 0016  lidocaine (XYLOCAINE) 1 % injection 0.5 mL  Once As Needed         05/07/24 0016    05/07/24 0001  NPO Diet NPO Type: Sips with Meds  Diet Effective Midnight,   Status:  Canceled         05/06/24 2346 05/07/24 0000  Vital Signs  Every 8 Hours        Comments: Per per hospital policy    05/06/24 2346    05/07/24 0000  Strict Intake & Output  Every 6 Hours         05/06/24 2346     "05/06/24 2347  Notify Physician (with Parameters)  Until Discontinued         05/06/24 2346    05/06/24 2347  Insert Peripheral IV  Once         05/06/24 2346 05/06/24 2347  Saline Lock & Maintain IV Access  Continuous,   Status:  Canceled         05/06/24 2346    05/06/24 2347  VTE Prophylaxis Not Indicated: Other: Patient Currently Anticoagulated / Receiving Prophylaxis  Once         05/06/24 2346 05/06/24 2347  Continuous Cardiac Monitoring  Continuous,   Status:  Canceled        Comments: Follow Standing Orders As Outlined in Process Instructions (Open Order Report to View Full Instructions)    05/06/24 2346 05/06/24 2347  Maintain IV Access  Continuous,   Status:  Canceled         05/06/24 2346 05/06/24 2347  Telemetry - Place Orders & Notify Provider of Results When Patient Experiences Acute Chest Pain, Dysrhythmia or Respiratory Distress  Continuous        Comments: Open Order Report to View Parameters Requiring Provider Notification    05/06/24 2346 05/06/24 2347  May Be Off Telemetry for Tests  Continuous         05/06/24 2346 05/06/24 2347  Continuous Pulse Oximetry  Continuous         05/06/24 2346 05/06/24 2347  Daily Weights  Daily       05/06/24 2346 05/06/24 2347  Diet: Cardiac; Healthy Heart (2-3 Na+); Fluid Consistency: Thin (IDDSI 0)  Diet Effective Now,   Status:  Canceled         05/06/24 2346 05/06/24 2347  Inpatient Cardiology Consult  Once        Specialty:  Cardiology  Provider:  (Not yet assigned)    05/06/24 2346 05/06/24 2346  sodium chloride 0.9 % flush 10 mL  As Needed         05/06/24 2346    05/06/24 2346  sodium chloride 0.9 % infusion 40 mL  As Needed         05/06/24 2346 05/06/24 2346  nitroglycerin (NITROSTAT) SL tablet 0.4 mg  Every 5 Minutes PRN         05/06/24 2346 05/06/24 2346  sennosides-docusate (PERICOLACE) 8.6-50 MG per tablet 2 tablet  2 Times Daily PRN        Placed in \"And\" Linked Group    05/06/24 2346 05/06/24 2346  " "polyethylene glycol (MIRALAX) packet 17 g  Daily PRN        Placed in \"And\" Linked Group    05/06/24 2346    05/06/24 2346  bisacodyl (DULCOLAX) EC tablet 5 mg  Daily PRN        Placed in \"And\" Linked Group    05/06/24 2346    05/06/24 2346  bisacodyl (DULCOLAX) suppository 10 mg  Daily PRN        Placed in \"And\" Linked Group    05/06/24 2346    05/06/24 2226  melatonin tablet 10 mg  Once         05/06/24 2210 05/06/24 2212  Hemoglobin A1c  STAT         05/06/24 2211 05/06/24 2211  Inpatient Admission  Once         05/06/24 2211 05/06/24 2211  Code Status and Medical Interventions:  Continuous         05/06/24 2211 05/06/24 2157  rosuvastatin (CRESTOR) tablet 20 mg  Once         05/06/24 2141 05/06/24 2155  heparin (porcine) 5000 UNIT/ML injection 4,000 Units  Once         05/06/24 2139 05/06/24 2155  heparin 70535 units/250 mL (100 units/mL) in 0.45 % NaCl infusion  Titrated         05/06/24 2139 05/06/24 2140  Initiate & Follow Heparin Protocol  Continuous         05/06/24 2139 05/06/24 2140  Notify Provider Platelet Count Less Than 28500  Continuous        Comments: Open Order Report to View Parameters Requiring Provider Notification    05/06/24 2139 05/06/24 2140  Stop Infusion & Notify Provider if Bleeding Occurs  Continuous        Comments: Open Order Report to View Parameters Requiring Provider Notification    05/06/24 2139 05/06/24 2140  Heparin Anti-Xa  STAT        Comments: Prior to Initial Heparin Bolus      05/06/24 2139 05/06/24 2140  Protime-INR  STAT        Comments: Prior to Initial Heparin Bolus      05/06/24 2139 05/06/24 2140  aPTT  STAT        Comments: Prior to Initial Heparin Bolus      05/06/24 2139 05/06/24 2139  heparin (porcine) 5000 UNIT/ML injection 4,000 Units  As Needed         05/06/24 2139 05/06/24 2139  heparin (porcine) 5000 UNIT/ML injection 2,000 Units  As Needed         05/06/24 2139 05/06/24 2139  Pharmacy to Dose Heparin  Continuous " PRN         05/06/24 2139    05/06/24 1931  nitroglycerin (NITROSTAT) SL tablet 0.4 mg  Once         05/06/24 1915    05/06/24 1836  Single High Sensitivity Troponin T  STAT         05/06/24 1835    05/06/24 1833  ECG 12 Lead Chest Pain  STAT         05/06/24 1833    05/06/24 1830  iopamidol (ISOVUE-370) 76 % injection 100 mL  Once in Imaging         05/06/24 1814    05/06/24 1716  CT Angiogram Heart With 3D Image  1 Time Imaging         05/06/24 1635    05/06/24 1637  ECG 12 Lead Chest Pain  STAT         05/06/24 1636    05/06/24 1635  CT Angiogram Coronary  1 Time Imaging,   Status:  Canceled         05/06/24 1635    05/06/24 1634  metoprolol tartrate (LOPRESSOR) tablet 50 mg  Once As Needed         05/06/24 1635    05/06/24 1619  High Sensitivity Troponin T 2Hr  PROCEDURE ONCE         05/06/24 1453    05/06/24 1543  CK  Once         05/06/24 1542    05/06/24 1543  High Sensitivity Troponin T  STAT,   Status:  Canceled         05/06/24 1542    05/06/24 1439  XR Chest 1 View  1 Time Imaging         05/06/24 1359    05/06/24 1415  aspirin tablet 325 mg  Once         05/06/24 1359    05/06/24 1400  NPO Diet NPO Type: Strict NPO  Diet Effective Now,   Status:  Canceled         05/06/24 1359    05/06/24 1400  Undress and Gown  Once         05/06/24 1359    05/06/24 1400  Cardiac Monitoring  Continuous,   Status:  Canceled        Comments: Follow Standing Orders As Outlined in Process Instructions (Open Order Report to View Full Instructions)    05/06/24 1359 05/06/24 1400  Continuous Pulse Oximetry  Continuous,   Status:  Canceled         05/06/24 1359    05/06/24 1400  ECG 12 Lead ED Triage Standing Order; Chest Pain  Once         05/06/24 1359    05/06/24 1400  XR Chest 2 View  1 Time Imaging,   Status:  Canceled         05/06/24 1359    05/06/24 1400  Insert Peripheral IV  Once         05/06/24 1359    05/06/24 1400  [place] Draw  Once         05/06/24 1359    05/06/24 1400  CBC & Differential  Once          05/06/24 1359    05/06/24 1400  Comprehensive Metabolic Panel  Once         05/06/24 1359    05/06/24 1400  High Sensitivity Troponin T  Once         05/06/24 1359    05/06/24 1400  Green Top (Gel)  PROCEDURE ONCE         05/06/24 1359    05/06/24 1400  Lavender Top  PROCEDURE ONCE         05/06/24 1359    05/06/24 1400  Gold Top - SST  PROCEDURE ONCE         05/06/24 1359    05/06/24 1400  Light Blue Top  PROCEDURE ONCE         05/06/24 1359    05/06/24 1400  CBC Auto Differential  PROCEDURE ONCE         05/06/24 1359    05/06/24 1359  sodium chloride 0.9 % flush 10 mL  As Needed         05/06/24 1359    05/06/24 0000  Telemetry Scan  Once         05/06/24 0000    05/06/24 0000  Telemetry Scan  Once         05/06/24 0000    Unscheduled  Oxygen Therapy- Nasal Cannula; Titrate 1-6 LPM Per SpO2; 90 - 95%  Continuous PRN       05/06/24 1359    Unscheduled  ECG 12 Lead ED Triage Standing Order; Chest Pain  As Needed      Comments: Persistent, Unrelieved or Recurrent Chest Pain    05/06/24 1359    Unscheduled  Up With Assistance  As Needed       05/06/24 2346    Unscheduled  Change Dressing to IV Site As Needed When Damp, Loose or Soiled  As Needed       05/07/24 0145    Unscheduled  Change Needleless Connectors  As Needed      Comments: Change Needleless Connectors When:  - Administration Set Changed  - Dressing Changed  - Removed For Any Reason  - Residual Blood or Debris Within Connector  - Prior to Drawing Blood Cultures  - Contamination of Connector  - After Administration of Blood or Blood Components    05/07/24 0145    Unscheduled  Insert New Peripheral IV  As Needed      Comments: Frequency Per Facility Policy    05/07/24 0145

## 2024-05-08 ENCOUNTER — READMISSION MANAGEMENT (OUTPATIENT)
Dept: CALL CENTER | Facility: HOSPITAL | Age: 64
End: 2024-05-08
Payer: COMMERCIAL

## 2024-05-08 ENCOUNTER — APPOINTMENT (OUTPATIENT)
Dept: CARDIOLOGY | Facility: HOSPITAL | Age: 64
End: 2024-05-08
Payer: COMMERCIAL

## 2024-05-08 VITALS
RESPIRATION RATE: 18 BRPM | OXYGEN SATURATION: 97 % | BODY MASS INDEX: 25.62 KG/M2 | TEMPERATURE: 98.2 F | DIASTOLIC BLOOD PRESSURE: 64 MMHG | HEIGHT: 71 IN | WEIGHT: 183 LBS | SYSTOLIC BLOOD PRESSURE: 121 MMHG | HEART RATE: 82 BPM

## 2024-05-08 LAB
ANION GAP SERPL CALCULATED.3IONS-SCNC: 10 MMOL/L (ref 5–15)
BH CV ECHO MEAS - AO MAX PG: 6.6 MMHG
BH CV ECHO MEAS - AO MEAN PG: 3 MMHG
BH CV ECHO MEAS - AO ROOT DIAM: 3.5 CM
BH CV ECHO MEAS - AO V2 MAX: 128.5 CM/SEC
BH CV ECHO MEAS - AO V2 VTI: 30.7 CM
BH CV ECHO MEAS - AVA(I,D): 2.7 CM2
BH CV ECHO MEAS - EDV(CUBED): 117.6 ML
BH CV ECHO MEAS - EDV(MOD-SP2): 154 ML
BH CV ECHO MEAS - EDV(MOD-SP4): 113 ML
BH CV ECHO MEAS - EF(MOD-BP): 68.6 %
BH CV ECHO MEAS - EF(MOD-SP2): 67.2 %
BH CV ECHO MEAS - EF(MOD-SP4): 69.3 %
BH CV ECHO MEAS - ESV(CUBED): 18.6 ML
BH CV ECHO MEAS - ESV(MOD-SP2): 50.5 ML
BH CV ECHO MEAS - ESV(MOD-SP4): 34.7 ML
BH CV ECHO MEAS - FS: 45.9 %
BH CV ECHO MEAS - IVS/LVPW: 0.94 CM
BH CV ECHO MEAS - IVSD: 0.85 CM
BH CV ECHO MEAS - LA DIMENSION: 3.5 CM
BH CV ECHO MEAS - LAT PEAK E' VEL: 12.1 CM/SEC
BH CV ECHO MEAS - LV MASS(C)D: 147.4 GRAMS
BH CV ECHO MEAS - LV MAX PG: 4.1 MMHG
BH CV ECHO MEAS - LV MEAN PG: 2 MMHG
BH CV ECHO MEAS - LV V1 MAX: 100.9 CM/SEC
BH CV ECHO MEAS - LV V1 VTI: 24.3 CM
BH CV ECHO MEAS - LVIDD: 4.9 CM
BH CV ECHO MEAS - LVIDS: 2.7 CM
BH CV ECHO MEAS - LVOT AREA: 3.5 CM2
BH CV ECHO MEAS - LVOT DIAM: 2.1 CM
BH CV ECHO MEAS - LVPWD: 0.9 CM
BH CV ECHO MEAS - MED PEAK E' VEL: 10.6 CM/SEC
BH CV ECHO MEAS - MV A MAX VEL: 48.9 CM/SEC
BH CV ECHO MEAS - MV DEC SLOPE: 469 CM/SEC2
BH CV ECHO MEAS - MV DEC TIME: 0.2 SEC
BH CV ECHO MEAS - MV E MAX VEL: 87.7 CM/SEC
BH CV ECHO MEAS - MV E/A: 1.79
BH CV ECHO MEAS - MV P1/2T: 60.9 MSEC
BH CV ECHO MEAS - MVA(P1/2T): 3.6 CM2
BH CV ECHO MEAS - PA ACC TIME: 0.15 SEC
BH CV ECHO MEAS - PA V2 MAX: 111 CM/SEC
BH CV ECHO MEAS - PAPD(PI EDV): 2 MMHG
BH CV ECHO MEAS - PI END-D VEL: 73.8 CM/SEC
BH CV ECHO MEAS - RAP SYSTOLE: 8 MMHG
BH CV ECHO MEAS - SV(LVOT): 84.2 ML
BH CV ECHO MEAS - SV(MOD-SP2): 103.5 ML
BH CV ECHO MEAS - SV(MOD-SP4): 78.3 ML
BH CV ECHO MEAS - TAPSE (>1.6): 3.5 CM
BH CV ECHO MEASUREMENTS AVERAGE E/E' RATIO: 7.73
BH CV VAS BP LEFT ARM: NORMAL MMHG
BH CV XLRA - RV BASE: 5 CM
BH CV XLRA - RV LENGTH: 8.6 CM
BH CV XLRA - RV MID: 3.6 CM
BH CV XLRA - TDI S': 15.3 CM/SEC
BUN SERPL-MCNC: 12 MG/DL (ref 8–23)
BUN/CREAT SERPL: 12.4 (ref 7–25)
CALCIUM SPEC-SCNC: 8.7 MG/DL (ref 8.6–10.5)
CHLORIDE SERPL-SCNC: 101 MMOL/L (ref 98–107)
CO2 SERPL-SCNC: 26 MMOL/L (ref 22–29)
CREAT SERPL-MCNC: 0.97 MG/DL (ref 0.76–1.27)
DEPRECATED RDW RBC AUTO: 48 FL (ref 37–54)
EGFRCR SERPLBLD CKD-EPI 2021: 88.8 ML/MIN/1.73
ERYTHROCYTE [DISTWIDTH] IN BLOOD BY AUTOMATED COUNT: 14.1 % (ref 12.3–15.4)
GLUCOSE SERPL-MCNC: 126 MG/DL (ref 65–99)
HCT VFR BLD AUTO: 40.1 % (ref 37.5–51)
HGB BLD-MCNC: 13.3 G/DL (ref 13–17.7)
IVRT: 70 MS
LEFT ATRIUM VOLUME INDEX: 32 ML/M2
LV EF 2D ECHO EST: 68 %
MAGNESIUM SERPL-MCNC: 2.1 MG/DL (ref 1.6–2.4)
MCH RBC QN AUTO: 30.5 PG (ref 26.6–33)
MCHC RBC AUTO-ENTMCNC: 33.2 G/DL (ref 31.5–35.7)
MCV RBC AUTO: 92 FL (ref 79–97)
PHOSPHATE SERPL-MCNC: 4 MG/DL (ref 2.5–4.5)
PLATELET # BLD AUTO: 215 10*3/MM3 (ref 140–450)
PMV BLD AUTO: 11.2 FL (ref 6–12)
POTASSIUM SERPL-SCNC: 4.3 MMOL/L (ref 3.5–5.2)
QT INTERVAL: 396 MS
QTC INTERVAL: 430 MS
RBC # BLD AUTO: 4.36 10*6/MM3 (ref 4.14–5.8)
SODIUM SERPL-SCNC: 137 MMOL/L (ref 136–145)
WBC NRBC COR # BLD AUTO: 14.43 10*3/MM3 (ref 3.4–10.8)

## 2024-05-08 PROCEDURE — 85027 COMPLETE CBC AUTOMATED: CPT | Performed by: INTERNAL MEDICINE

## 2024-05-08 PROCEDURE — 99232 SBSQ HOSP IP/OBS MODERATE 35: CPT | Performed by: INTERNAL MEDICINE

## 2024-05-08 PROCEDURE — 80048 BASIC METABOLIC PNL TOTAL CA: CPT | Performed by: INTERNAL MEDICINE

## 2024-05-08 PROCEDURE — 93010 ELECTROCARDIOGRAM REPORT: CPT | Performed by: INTERNAL MEDICINE

## 2024-05-08 PROCEDURE — 84100 ASSAY OF PHOSPHORUS: CPT | Performed by: INTERNAL MEDICINE

## 2024-05-08 PROCEDURE — 93005 ELECTROCARDIOGRAM TRACING: CPT | Performed by: INTERNAL MEDICINE

## 2024-05-08 PROCEDURE — 83735 ASSAY OF MAGNESIUM: CPT | Performed by: INTERNAL MEDICINE

## 2024-05-08 PROCEDURE — 93306 TTE W/DOPPLER COMPLETE: CPT | Performed by: INTERNAL MEDICINE

## 2024-05-08 PROCEDURE — 25010000002 EPTIFIBATIDE PER 5 MG: Performed by: INTERNAL MEDICINE

## 2024-05-08 PROCEDURE — 93306 TTE W/DOPPLER COMPLETE: CPT

## 2024-05-08 RX ORDER — METOPROLOL SUCCINATE 25 MG/1
25 TABLET, EXTENDED RELEASE ORAL DAILY
Qty: 90 TABLET | Refills: 1 | Status: SHIPPED | OUTPATIENT
Start: 2024-05-08

## 2024-05-08 RX ORDER — DOPAMINE HYDROCHLORIDE 160 MG/100ML
2-20 INJECTION, SOLUTION INTRAVENOUS
Status: DISCONTINUED | OUTPATIENT
Start: 2024-05-08 | End: 2024-05-08 | Stop reason: HOSPADM

## 2024-05-08 RX ORDER — ASPIRIN 81 MG/1
81 TABLET ORAL DAILY
Qty: 100 TABLET | Refills: 1 | Status: SHIPPED | OUTPATIENT
Start: 2024-05-09

## 2024-05-08 RX ORDER — NITROGLYCERIN 0.4 MG/1
0.4 TABLET SUBLINGUAL
Qty: 25 TABLET | Refills: 1 | Status: SHIPPED | OUTPATIENT
Start: 2024-05-08

## 2024-05-08 RX ORDER — ROSUVASTATIN CALCIUM 20 MG/1
20 TABLET, COATED ORAL NIGHTLY
Qty: 90 TABLET | Refills: 1 | Status: SHIPPED | OUTPATIENT
Start: 2024-05-08

## 2024-05-08 RX ADMIN — EPTIFIBATIDE 2 MCG/KG/MIN: 75 INJECTION INTRAVENOUS at 03:59

## 2024-05-08 RX ADMIN — TICAGRELOR 90 MG: 90 TABLET ORAL at 08:24

## 2024-05-08 RX ADMIN — ASPIRIN 81 MG: 81 TABLET, COATED ORAL at 08:24

## 2024-05-08 NOTE — PAYOR COMM NOTE
"[facility]  NPI:[id number]    Utilization Review  Contact: Mikayla Li RN  Phone: [phone]  Fax:[phone]    DISCHARGE NOTIFICATION   [id number]       Timmy Chew (63 y.o. Male)       Date of Birth   1960    Social Security Number       Address   [address] [place] [id number]    Home Phone   [phone]    MRN   [id number]       Gnosticism   None    Marital Status                               Admission Date   24    Admission Type   Emergency    Admitting Provider   Gopal Cohen MD    Attending Provider       Department, Room/Bed   [facility] CATHETERIZATION LAB, Pool/CATH       Discharge Date   2024    Discharge Disposition   Transfer to Another Facility    Discharge Destination   Other                              Attending Provider: (none)   Allergies: No Known Allergies    Isolation: None   Infection: None   Code Status: CPR    Ht: 180.3 cm (71\")   Wt: 84.6 kg (186 lb 8 oz)    Admission Cmt: None   Principal Problem: NSTEMI (non-ST elevated myocardial infarction) [I21.4]                   Active Insurance as of 2024       Primary Coverage       Payor Plan Insurance Group Employer/Plan Group    [employer]CIAL A[employer] [id number]       Payor Plan Address Payor Plan Phone Number Payor Plan Fax Number Effective Dates    [address] [phone]  2022 - None Entered    [facility] [id number]         Subscriber Name Subscriber Birth Date Member ID       TIMMY CHEW 1960 [id number]                     Emergency Contacts        (Rel.) Home Phone Work Phone Mobile Phone    NAINAMARK (Spouse) [phone] -- --                 Discharge Summary        Kennedy Mcintyre MD at 24 1235              [facility] HOSPITALISTS DISCHARGE SUMMARY    Patient Identification:  Name:  Timmy Chew  Age:  63 y.o.  Sex:  male  :  1960  MRN:  [id number]  Visit Number:  [id number]    Date of " Admission: 5/6/2024  Date of Discharge:  5/7/2024     PCP: Provider, No Known    DISCHARGE DIAGNOSIS  NSTEMI Type I  New severe multivessel Coronary Artery Disease  Pre-Diabetes  History Testicular cancer status post radiation    CONSULTS   Consults       Date and Time Order Name Status Description    5/6/2024 11:46 PM Inpatient Cardiology Consult            PROCEDURES PERFORMED  Procedure(s) (LRB):  Left Heart Cath (N/A)    HOSPITAL COURSE  63M PMH Testicular cancer status post radiation, presented to [facility] emergency room 5/6 with complaints of chest pain with exertion, though also in setting of 1 month history of similar exertional chest pressure, and recent stress of losing his mother.      #NSTEMI Type I, in setting of new severe multivessel Coronary Artery Disease  Labs showed HS Troponins 27->67->407, WBC count 14K, . EKG showed sinus bradycardia, possible left atrial enlargement. CTA Coronaries showed multifocal calcific plaque RCA, non-calcified significant narrowing midportion RCA and high grade stenosis distal RCA, LAD with multifocal calcific plaque with mod stenosis and noncalcified narrowing of proximal LAD with high grade stenosis, multifocal plaque in LCirc causing mild-mod stenosis, total calcium score = 882.  Cardiology consulted and followed, took for [facility] which showed severe multivessel disease, notably high risk lesions LAD and RCA per conversation with Dr. Hodges, formal report to follow.  Recommended transfer to [facility] to discuss high risk PCI vs CABG, Heparin drip on hold, will plan to resume 4-6 hrs post cath, continue Aspirin 81, statin, Cardiology started on new beta blocker.  Patient seen post cath and doing well, no complaints.  Discussed with family and arranging transport as patient now has a bed.    #Pre-Diabetes  Hgb A1c = 6.2%.  Did not require long acting insulin.    #History Testicular cancer status post radiation    Edited by: Kennedy Mcintyre,  MD at 5/7/2024 1235    VITAL SIGNS:  Temp:  [98.1 °F (36.7 °C)-98.6 °F (37 °C)] 98.1 °F (36.7 °C)  Heart Rate:  [57-80] 73  Resp:  [11-20] 16  BP: (109-172)/() 148/96  SpO2:  [90 %-99 %] 99 %  on  Flow (L/min):  [2] 2;   Device (Oxygen Therapy): nasal cannula    Body mass index is 26.01 kg/m².  Wt Readings from Last 3 Encounters:   05/07/24 84.6 kg (186 lb 8 oz)     PHYSICAL EXAM:  Constitutional:  Well-developed and well-nourished.  No acute distress.      HENT:  Head:  Normocephalic and atraumatic.  Mouth:  Moist mucous membranes.    Eyes:  Conjunctivae and EOM are normal. No scleral icterus.    Neck:  Neck supple.  No JVD present.    Cardiovascular:  Normal rate, regular rhythm and normal heart sounds with no murmur.  Pulmonary/Chest:  No respiratory distress, no wheezes, no crackles, with normal breath sounds and good air movement.  Abdominal:  Soft. No distension and no tenderness.   Musculoskeletal:  No tenderness, and no deformity.  No red or swollen joints anywhere.    Neurological:  Alert and oriented to person, place, and time.  No gross focal deficits   Skin:  Skin is warm and dry. No rash noted. No pallor.   Peripheral vascular:  No clubbing, no cyanosis, no edema.  Psychiatric: Appropriate mood and affect  Edited by: Kennedy Mcintyre MD at 5/7/2024 1235    DISCHARGE DISPOSITION   Stable    DISCHARGE MEDICATIONS:     Discharge Medications        New Medications        Instructions Start Date   aspirin 81 MG chewable tablet   81 mg, Oral, Daily   Start Date: May 8, 2024     metoprolol tartrate 100 MG tablet  Commonly known as: LOPRESSOR   50 mg, Oral, Once As Needed      rosuvastatin 20 MG tablet  Commonly known as: CRESTOR   20 mg, Oral, Nightly                Follow-up Information       Provider, No Known .    Contact information:  [facility]  [address] [address] [phone]                            TEST  RESULTS PENDING AT DISCHARGE  None     CODE STATUS  Code Status and Medical  Interventions:   Ordered at: 05/06/24 2212     Code Status (Patient has no pulse and is not breathing):    CPR (Attempt to Resuscitate)     Medical Interventions (Patient has pulse or is breathing):    Full Support     Kennedy Mcintyre MD  [facility]  05/07/24  12:35 EDT    Please note that this discharge summary required more than 30 minutes to complete.        Electronically signed by Kennedy Mcintyre MD at 05/07/24 1600

## 2024-05-08 NOTE — CASE MANAGEMENT/SOCIAL WORK
Continued Stay Note  [facility]     Patient Name: Jaylan Chicas  MRN: [id number]  Today's Date: 5/8/2024    Admit Date: 5/7/2024    Plan: Home   Discharge Plan       Row Name 05/08/24 1500       Plan    Plan Home    Patient/Family in Agreement with Plan yes    Plan Comments Spoke to spouse at bedside r/t patient resting with eyes closed. Denies any difficulty paying for meds. Unsure on when they will be traveling back to [place] which is home of residence. Denies any discharge needs. CM will cont to follow.    Final Discharge Disposition Code 01 - home or self-care                   Discharge Codes    No documentation.                 Expected Discharge Date and Time       Expected Discharge Date Expected Discharge Time    May 8, 2024               Concepcion Lopez RN

## 2024-05-08 NOTE — PROGRESS NOTES
"[facility] at [facility]  IP Progress Note      Chief Complaint: Follow-up of ACS/CAD    Subjective   The patient underwent two-vessel stenting yesterday and was moved to telemetry where he complained of mild ongoing discomfort with EKG showing subtle inferior injury pattern.  He was also hypertensive and required increasing the dose of nitroglycerin as well as IV and oral Lopressor.  Subsequently he developed bradycardia with drop in blood pressure and was transferred to the ICU.  Received volume resuscitation.  His chest pain has completely resolved.  States that he will got up and walked at 4 AM.  Currently resting, denies chest pain or shortness of breath and feels much better.    Objective     Blood pressure 118/65, pulse 75, temperature 97.9 °F (36.6 °C), temperature source Oral, resp. rate 16, height 180.3 cm (71\"), weight 83.1 kg (183 lb 4.8 oz), SpO2 94%.     Intake/Output Summary (Last 24 hours) at 5/8/2024 0650  Last data filed at 5/8/2024 0600  Gross per 24 hour   Intake --   Output 800 ml   Net -800 ml       Physical Exam:  General: No acute distress.   Neck: no JVD.  Chest:No respiratory distress, breath sounds are normal. No wheezes,  rhonchi or rales.  Cardiovascular: Normal S1 and S2, no murmur, gallop or rub.    Extremities: No edema.  Right wrist and right groin stable.    Results Review:     I reviewed the patient's new clinical results.    Results from last 7 days   Lab Units 05/08/24  0400   WBC 10*3/mm3 14.43*   HEMOGLOBIN g/dL 13.3   HEMATOCRIT % 40.1   PLATELETS 10*3/mm3 215     Results from last 7 days   Lab Units 05/08/24  0400 05/07/24 2012   SODIUM mmol/L 137 137   POTASSIUM mmol/L 4.3 3.8   CHLORIDE mmol/L 101 102   CO2 mmol/L 26.0 24.0   BUN mg/dL 12 11   CREATININE mg/dL 0.97 0.89   CALCIUM mg/dL 8.7 9.0   BILIRUBIN mg/dL  --  0.8   ALK PHOS U/L  --  100   ALT (SGPT) U/L  --  17   AST (SGOT) U/L  --  29   GLUCOSE mg/dL 126* 142*     Results from last 7 days "   Lab Units 05/08/24  0400   SODIUM mmol/L 137   POTASSIUM mmol/L 4.3   CHLORIDE mmol/L 101   CO2 mmol/L 26.0   BUN mg/dL 12   CREATININE mg/dL 0.97   GLUCOSE mg/dL 126*   CALCIUM mg/dL 8.7     Results from last 7 days   Lab Units 05/06/24  1419   INR  0.93     Lab Results   Component Value Date    CKTOTAL 187 05/07/2024    CKMB 20.89 (H) 05/07/2024    TROPONINT 522 (C) 05/07/2024         Results from last 7 days   Lab Units 05/07/24  0419   CHOLESTEROL mg/dL 194   TRIGLYCERIDES mg/dL 119   HDL CHOL mg/dL 43   LDL CHOL mg/dL 129*     aspirin, 81 mg, Oral, Daily  [Held by provider] metoprolol tartrate, 12.5 mg, Oral, Q12H  [START ON 5/9/2024] pharmacy consult - MTM, , Does not apply, Daily  rosuvastatin, 20 mg, Oral, Nightly  ticagrelor, 90 mg, Oral, BID       Tele: Sinus Rythym      Assessment:  ACS/severe two-vessel CAD.  Status post YUDELKA x 2 to the RCA and YUDELKA x 1 to the LAD.  Postprocedure chest pain with EKG abnormalities, suspect small vessel involvement, his chest pain has completely resolved and ST segments have returned to baseline.  He had continuous elevation of high-sensitivity troponin however total CPK within normal limits.  Bradycardia/hypotension periprocedurally and partly related to medications, resolved/stable.  Hypertension, controlled.  Dyslipidemia.  Plan:  Continue IV Integrilin until the current bottle is infused.  Continue aspirin and Brilinta, continue high intensity statin.  Restart metoprolol 12.5 mg twice daily.  Echocardiogram today to assess LV function and valve function.  Increase activities.  Consider discharging later today after echocardiogram and after ambulation if he remains stable.    iTm Day MD, FACC, [facility]

## 2024-05-08 NOTE — PROGRESS NOTES
Order received for Phase II Cardiac Rehab. Upon visitation staff found patient to be with medical personnel and family members attending their room. Cardiac Rehab staff will follow up.

## 2024-05-08 NOTE — DISCHARGE INSTR - APPOINTMENTS
You have an appointment to see Dr. Felipe Rainey on Monday, May 20th, 2024 at 10:30AM. Please take any records and Stent Implant Patient Card with you to this appointment.     Please follow-up with a Cardiologist within 4 weeks.

## 2024-05-08 NOTE — NURSING NOTE
Pt with c/o nausea and indigestion as well as chest pain. Pt diaphoretic upon examination and BP elevated above 130. Nitro gtt titrated up. Pt continued to have chest pain and sated that he didn't feel right. RRT called. See notes. After RRT, Integrilin gtt started and nitro gtt continued. Pt still complaining of feeling weak and clammy. Pulse dropping to 30's. Run of arrhythmia noted on telemetry. Pt felt his heartbeat in his throat. Notified providers. See orders. Right radial site deflated 2 mL with no leaking. 2mL removed once again with no issue. 2mL removed the third time and pt began to ooze. Reinflated radial band at 2245. Pt began to become bradycardic once again with pulse at 35 and 2 second pauses in between. Frequent PVCs on monitor. Per Dr. Mao, transferred to ICU.

## 2024-05-08 NOTE — PROGRESS NOTES
Intensive Care Follow-up     Hospital:  LOS: 1 day   Mr. Jaylan Chicas, 63 y.o. male is followed for:   ACS (acute coronary syndrome)            History of present illness:   63-year-old male transferred to intensive care unit for ongoing bradycardia, hemodynamic instability, diaphoresis.  Patient without any other chronic medical illness other than history of testicular cancer in the past presented with chest pressure which was radiating to his neck.  Patient presented to outside facility.  Patient had mild elevation of high-sensitivity troponin.  Underwent coronary CT angiogram which revealed moderate to high-grade stenosis of LAD, mild to moderate stenosis of left circumflex and high-grade stenosis in distal RCA.  Patient had ST depressions in leads III and aVF.  Patient subsequently underwent coronary angiogram which revealed multivessel coronary disease with high risk lesions in LAD and RCA.  Patient was discussed with interventional team here at [facility] and accepted by Dr. Day for PCI.  Patient underwent PTCA/stenting of mid RCA, PTCA/stenting of distal RCA and proximal LAD.  Radial access was used.    Postprocedure patient was admitted to telemetry floor where he started having nausea and chest pain.  EKG revealed changes concerning for inferior STEMI.  Cardiology team was contacted and interventional cardiology was also contacted.  Decision was made not to proceed with any further coronary intervention but manage patient symptomatically with possible hypotension from right ventricular involvement.  Patient was given  500 mL of saline bolus and transferred to intensive care unit for closer monitoring.    On my arrival patient is appearing comfortable.  States that he is just sad so many things are going on with his heart.  Try to reassure her that we are trying our best to take care of him.  Currently he is chest pain-free.  Denies any dizziness.  Denies any nausea.  Heart rate is in 70s.  Sinus rhythm.  Blood  "pressure is acceptable.          The patient's past medical, surgical and social history were reviewed and updated in Epic as appropriate.       Objective     Infusions:  eptifibatide, 2 mcg/kg/min, Last Rate: 2 mcg/kg/min (05/07/24 2236)  norepinephrine, 0.02-0.3 mcg/kg/min      Medications:  aspirin, 81 mg, Oral, Daily  [Held by provider] metoprolol tartrate, 12.5 mg, Oral, Q12H  rosuvastatin, 20 mg, Oral, Nightly  ticagrelor, 90 mg, Oral, BID        Vital Sign Min/Max for last 24 hours  Temp  Min: 97.9 °F (36.6 °C)  Max: 99.3 °F (37.4 °C)   BP  Min: 57/43  Max: 164/80   Pulse  Min: 36  Max: 95   Resp  Min: 11  Max: 20   SpO2  Min: 91 %  Max: 99 %   Flow (L/min)  Min: 2  Max: 2       Input/Output for last 24 hour shift  05/07 0701 - 05/08 0700  In: -   Out: 300 [Urine:300]      Objective:  Vital signs: (most recent): Blood pressure 124/76, pulse (!) 40, temperature 97.9 °F (36.6 °C), temperature source Oral, resp. rate 18, height 180.3 cm (71\"), weight 83.1 kg (183 lb 4.8 oz), SpO2 97%.            General Appearance: Awake, alert, in no acute distress   Lungs:   B/L Breath sounds present with decreased breath sounds on bases, no wheezing heard, no crackles.   Heart: S1 and S2 present, no murmur  Abdomen: Soft, nontender, no guarding or rigidity, bowel sounds positive.  Extremities:  No edema, warm to touch.  Right groin access site without any bleeding or hematoma, right wrist occlusive device in place.  Pulses: Positive and symmetric.  Capillary refill: <3s   Neurologic:  Moving all four extremities. Good strength bilaterally.  No focal deficit  Psychological: Normal affect, Cooperative      Results from last 7 days   Lab Units 05/07/24 2035 05/07/24  0419 05/06/24  1419   WBC 10*3/mm3 14.77* 14.71* 9.56   HEMOGLOBIN g/dL 14.0 14.0 14.8   PLATELETS 10*3/mm3 226 193 238     Results from last 7 days   Lab Units 05/07/24 2012 05/07/24  0419 05/06/24  1419   SODIUM mmol/L 137 137 139   POTASSIUM mmol/L 3.8 4.1 4.1 "   CO2 mmol/L 24.0 27.8 28.6   BUN mg/dL 11 15 16   CREATININE mg/dL 0.89 1.04 1.16   MAGNESIUM mg/dL 2.0  --   --    GLUCOSE mg/dL 142* 96 86     Estimated Creatinine Clearance: 102.4 mL/min (by C-G formula based on SCr of 0.89 mg/dL).          Images:   Multiple EKGs reviewed and EKG at 7:55 PM showed changes concerning for acute  inferior STEMI.  Repeat EKG an hour later showed marked sinus bradycardia without any ongoing acute ST changes.  I reviewed the patient's results and images.     Assessment & Plan   Impression        ACS (acute coronary syndrome)       Plan        1.  Patient presented with multivessel coronary disease s/p multiple coronary interventions done today with post procedure.  Arrhythmia and hypotension thought to be due to right ventricular involvement.  Discussed with cardiology team and plan is to symptomatically manage the patient at this point.  If he becomes bradycardic or hypotensive will use dopamine infusion.  May need norepinephrine infusion if gets more hemodynamically unstable.  Fluid boluses as needed.  2.  Hold beta-blockers for now until hemodynamically more stable.  3.  Continue aspirin, statin.  4.  Patient currently on Integrilin per protocol post coronary intervention.  Defer management to cardiology team.  5.  Monitor urine output and electrolytes and replace electrolytes per ICU protocol.  6.  Bedrest for tonight.  7.  Slowly advance diet as tolerated.    Close monitoring in ICU for now    Plan of care and goals reviewed with multidisciplinary/antibiotic stewardship team during rounds.   I discussed the patient's findings and my recommendations with patient and nursing staff       Time spent  35 min  (exclusive of procedure time)  including high complexity decision making to assess, manipulate, and support vital organ system failure in this individual who has impairment of one or more vital organ systems such that there is a high probability of imminent or life threatening  deterioration in the patient’s condition.      Bucky Barker MD, FCCP  Pulmonary, Critical care and Sleep Medicine

## 2024-05-08 NOTE — PAYOR COMM NOTE
"Timmy Chicas (63 y.o. Male)       Date of Birth   1960    Social Security Number       Address   [address] [address]    Home Phone   [phone]    MRN   [id number]       Muslim   None    Marital Status                               Admission Date   5/7/24    Admission Type   Elective    Admitting Provider   Tim Day MD    Attending Provider   Tim Day MD    [facility]   [facility]       Discharge Date       Discharge Disposition       Discharge Destination                                 Attending Provider: Tim Day MD    Allergies: No Known Allergies    Isolation: None   Infection: None   Code Status: CPR    Ht: 180.3 cm (71\")   Wt: 83 kg (183 lb)    Admission Cmt: None   Principal Problem: ACS (acute coronary syndrome) [I24.9]                   Active Insurance as of 5/7/2024       Primary Coverage       Payor Plan Insurance Group Employer/Plan Group    AETNA COMMERCIAL AETNA [id number]       Payor Plan Address Payor Plan Phone Number Payor Plan Fax Number Effective Dates    [address] [phone]  6/1/2022 - None Entered    [place] [address]         Subscriber Name Subscriber Birth Date Member ID       TIMMY CHICAS 1960 [id number]                     Emergency Contacts        (Rel.) Home Phone Work Phone Mobile Phone    MARK CHICAS (Spouse) [phone] -- --              Lab Results (last 24 hours)       Procedure Component Value Units Date/Time    CBC (No Diff) [[id number]]  (Abnormal) Collected: 05/08/24 0400    Specimen: Blood Updated: 05/08/24 0443     WBC 14.43 10*3/mm3      RBC 4.36 10*6/mm3      Hemoglobin 13.3 g/dL      Hematocrit 40.1 %      MCV 92.0 fL      MCH 30.5 pg      MCHC 33.2 g/dL      RDW 14.1 %      RDW-SD 48.0 fl      MPV 11.2 fL      Platelets 215 10*3/mm3     Basic Metabolic Panel [[id number]]  (Abnormal) Collected: 05/08/24 0400    Specimen: Blood Updated: 05/08/24 " 0443     Glucose 126 mg/dL      BUN 12 mg/dL      Creatinine 0.97 mg/dL      Sodium 137 mmol/L      Potassium 4.3 mmol/L      Comment: Slight hemolysis detected by analyzer. Result may be falsely elevated.        Chloride 101 mmol/L      CO2 26.0 mmol/L      Calcium 8.7 mg/dL      BUN/Creatinine Ratio 12.4     Anion Gap 10.0 mmol/L      eGFR 88.8 mL/min/1.73     Narrative:      GFR Normal >60  Chronic Kidney Disease <60  Kidney Failure <15      Magnesium [[id number]]  (Normal) Collected: 05/08/24 0400    Specimen: Blood Updated: 05/08/24 0443     Magnesium 2.1 mg/dL     Phosphorus [[id number]]  (Normal) Collected: 05/08/24 0400    Specimen: Blood Updated: 05/08/24 0443     Phosphorus 4.0 mg/dL     High Sensitivity Troponin T 2Hr [[id number]]  (Abnormal) Collected: 05/07/24 2151    Specimen: Blood Updated: 05/07/24 2229     HS Troponin T 522 ng/L      Troponin T Delta 98 ng/L     Narrative:      High Sensitive Troponin T Reference Range:  <14.0 ng/L- Negative Female for AMI  <22.0 ng/L- Negative Male for AMI  >=14 - Abnormal Female indicating possible myocardial injury.  >=22 - Abnormal Male indicating possible myocardial injury.   Clinicians would have to utilize clinical acumen, EKG, Troponin, and serial changes to determine if it is an Acute Myocardial Infarction or myocardial injury due to an underlying chronic condition.         CK Total & CKMB [[id number]]  (Abnormal) Collected: 05/07/24 2012    Specimen: Blood Updated: 05/07/24 2132     CKMB 20.89 ng/mL      Creatine Kinase 187 U/L     Narrative:      CKMB results may be falsely decreased if patient taking Biotin.    Magnesium [[id number]]  (Normal) Collected: 05/07/24 2012    Specimen: Blood Updated: 05/07/24 2110     Magnesium 2.0 mg/dL     High Sensitivity Troponin T [[id number]]  (Abnormal) Collected: 05/07/24 2012    Specimen: Blood Updated: 05/07/24 2103     HS Troponin T 424 ng/L     Narrative:      High Sensitive Troponin T Reference Range:  <14.0 ng/L-  Negative Female for AMI  <22.0 ng/L- Negative Male for AMI  >=14 - Abnormal Female indicating possible myocardial injury.  >=22 - Abnormal Male indicating possible myocardial injury.   Clinicians would have to utilize clinical acumen, EKG, Troponin, and serial changes to determine if it is an Acute Myocardial Infarction or myocardial injury due to an underlying chronic condition.         aPTT [[id number]]  (Abnormal) Collected: 05/07/24 2012    Specimen: Blood Updated: 05/07/24 2100     PTT 44.7 seconds     Narrative:      PTT = The equivalent PTT values for the therapeutic range of heparin levels at 0.3 to 0.5 U/ml are 60 to 70 seconds.    Comprehensive Metabolic Panel [[id number]]  (Abnormal) Collected: 05/07/24 2012    Specimen: Blood Updated: 05/07/24 2056     Glucose 142 mg/dL      BUN 11 mg/dL      Creatinine 0.89 mg/dL      Sodium 137 mmol/L      Potassium 3.8 mmol/L      Chloride 102 mmol/L      CO2 24.0 mmol/L      Calcium 9.0 mg/dL      Total Protein 6.5 g/dL      Albumin 4.1 g/dL      ALT (SGPT) 17 U/L      AST (SGOT) 29 U/L      Alkaline Phosphatase 100 U/L      Total Bilirubin 0.8 mg/dL      Globulin 2.4 gm/dL      Comment: Calculated Result        A/G Ratio 1.7 g/dL      BUN/Creatinine Ratio 12.4     Anion Gap 11.0 mmol/L      eGFR 97.5 mL/min/1.73     Narrative:      GFR Normal >60  Chronic Kidney Disease <60  Kidney Failure <15      Lactic Acid, Plasma [[id number]]  (Normal) Collected: 05/07/24 2012    Specimen: Blood Updated: 05/07/24 2056     Lactate 1.7 mmol/L      Comment: Falsely depressed results may occur on samples drawn from patients receiving N-Acetylcysteine (NAC) or Metamizole.       CBC (No Diff) [[id number]]  (Abnormal) Collected: 05/07/24 2035    Specimen: Blood Updated: 05/07/24 2036     WBC 14.77 10*3/mm3      RBC 4.63 10*6/mm3      Hemoglobin 14.0 g/dL      Hematocrit 42.1 %      MCV 90.9 fL      MCH 30.2 pg      MCHC 33.3 g/dL      RDW 14.0 %      RDW-SD 47.1 fl      MPV 10.7 fL       "Platelets 226 10*3/mm3     POC Glucose Once [[id number]]  (Abnormal) Collected: 05/07/24 1956    Specimen: Blood Updated: 05/07/24 1958     Glucose 141 mg/dL           Imaging Results (Last 24 Hours)       ** No results found for the last 24 hours. **             Physician Progress Notes (last 24 hours)        Tim Day MD at 05/08/24 0650          [facility] Progress Note      Chief Complaint: Follow-up of ACS/CAD    Subjective   The patient underwent two-vessel stenting yesterday and was moved to telemetry where he complained of mild ongoing discomfort with EKG showing subtle inferior injury pattern.  He was also hypertensive and required increasing the dose of nitroglycerin as well as IV and oral Lopressor.  Subsequently he developed bradycardia with drop in blood pressure and was transferred to the ICU.  Received volume resuscitation.  His chest pain has completely resolved.  States that he will got up and walked at 4 AM.  Currently resting, denies chest pain or shortness of breath and feels much better.    Objective     Blood pressure 118/65, pulse 75, temperature 97.9 °F (36.6 °C), temperature source Oral, resp. rate 16, height 180.3 cm (71\"), weight 83.1 kg (183 lb 4.8 oz), SpO2 94%.     Intake/Output Summary (Last 24 hours) at 5/8/2024 0650  Last data filed at 5/8/2024 0600  Gross per 24 hour   Intake --   Output 800 ml   Net -800 ml       Physical Exam:  General: No acute distress.   Neck: no JVD.  Chest:No respiratory distress, breath sounds are normal. No wheezes,  rhonchi or rales.  Cardiovascular: Normal S1 and S2, no murmur, gallop or rub.    Extremities: No edema.  Right wrist and right groin stable.    Results Review:     I reviewed the patient's new clinical results.    Results from last 7 days   Lab Units 05/08/24  0400   WBC 10*3/mm3 14.43*   HEMOGLOBIN g/dL 13.3   HEMATOCRIT % 40.1   PLATELETS 10*3/mm3 215     Results from last 7 days   Lab Units " 05/08/24 0400 05/07/24 2012   SODIUM mmol/L 137 137   POTASSIUM mmol/L 4.3 3.8   CHLORIDE mmol/L 101 102   CO2 mmol/L 26.0 24.0   BUN mg/dL 12 11   CREATININE mg/dL 0.97 0.89   CALCIUM mg/dL 8.7 9.0   BILIRUBIN mg/dL  --  0.8   ALK PHOS U/L  --  100   ALT (SGPT) U/L  --  17   AST (SGOT) U/L  --  29   GLUCOSE mg/dL 126* 142*     Results from last 7 days   Lab Units 05/08/24 0400   SODIUM mmol/L 137   POTASSIUM mmol/L 4.3   CHLORIDE mmol/L 101   CO2 mmol/L 26.0   BUN mg/dL 12   CREATININE mg/dL 0.97   GLUCOSE mg/dL 126*   CALCIUM mg/dL 8.7     Results from last 7 days   Lab Units 05/06/24  1419   INR  0.93     Lab Results   Component Value Date    CKTOTAL 187 05/07/2024    CKMB 20.89 (H) 05/07/2024    TROPONINT 522 (C) 05/07/2024         Results from last 7 days   Lab Units 05/07/24  0419   CHOLESTEROL mg/dL 194   TRIGLYCERIDES mg/dL 119   HDL CHOL mg/dL 43   LDL CHOL mg/dL 129*     aspirin, 81 mg, Oral, Daily  [Held by provider] metoprolol tartrate, 12.5 mg, Oral, Q12H  [START ON 5/9/2024] pharmacy consult - MTM, , Does not apply, Daily  rosuvastatin, 20 mg, Oral, Nightly  ticagrelor, 90 mg, Oral, BID       Tele: Sinus Rythym      Assessment:  ACS/severe two-vessel CAD.  Status post YUDELKA x 2 to the RCA and YUDELKA x 1 to the LAD.  Postprocedure chest pain with EKG abnormalities, suspect small vessel involvement, his chest pain has completely resolved and ST segments have returned to baseline.  He had continuous elevation of high-sensitivity troponin however total CPK within normal limits.  Bradycardia/hypotension periprocedurally and partly related to medications, resolved/stable.  Hypertension, controlled.  Dyslipidemia.  Plan:  Continue IV Integrilin until the current bottle is infused.  Continue aspirin and Brilinta, continue high intensity statin.  Restart metoprolol 12.5 mg twice daily.  Echocardiogram today to assess LV function and valve function.  Increase activities.  Consider discharging later today after  echocardiogram and after ambulation if he remains stable.    Tim Day MD, [facility], [facility]                                      Electronically signed by Tim Day MD at 05/08/24 0743       Bucky Barker MD at 05/08/24 0001            Intensive Care Follow-up     Hospital:  LOS: 1 day   Mr. Jaylan Chicas, 63 y.o. male is followed for:   ACS (acute coronary syndrome)     Subjective       History of present illness:   63-year-old male transferred to intensive care unit for ongoing bradycardia, hemodynamic instability, diaphoresis.  Patient without any other chronic medical illness other than history of testicular cancer in the past presented with chest pressure which was radiating to his neck.  Patient presented to outside facility.  Patient had mild elevation of high-sensitivity troponin.  Underwent coronary CT angiogram which revealed moderate to high-grade stenosis of LAD, mild to moderate stenosis of left circumflex and high-grade stenosis in distal RCA.  Patient had ST depressions in leads III and aVF.  Patient subsequently underwent coronary angiogram which revealed multivessel coronary disease with high risk lesions in LAD and RCA.  Patient was discussed with interventional team here at [facility] and accepted by Dr. Day for PCI.  Patient underwent PTCA/stenting of mid RCA, PTCA/stenting of distal RCA and proximal LAD.  Radial access was used.    Postprocedure patient was admitted to telemetry floor where he started having nausea and chest pain.  EKG revealed changes concerning for inferior STEMI.  Cardiology team was contacted and interventional cardiology was also contacted.  Decision was made not to proceed with any further coronary intervention but manage patient symptomatically with possible hypotension from right ventricular involvement.  Patient was given  500 mL of saline bolus and transferred to intensive care unit for closer monitoring.    On my arrival patient is appearing comfortable.  States that  "he is just sad so many things are going on with his heart.  Try to reassure her that we are trying our best to take care of him.  Currently he is chest pain-free.  Denies any dizziness.  Denies any nausea.  Heart rate is in 70s.  Sinus rhythm.  Blood pressure is acceptable.          The patient's past medical, surgical and social history were reviewed and updated in Epic as appropriate.    Objective   Objective     Infusions:  eptifibatide, 2 mcg/kg/min, Last Rate: 2 mcg/kg/min (05/07/24 2236)  norepinephrine, 0.02-0.3 mcg/kg/min      Medications:  aspirin, 81 mg, Oral, Daily  [Held by provider] metoprolol tartrate, 12.5 mg, Oral, Q12H  rosuvastatin, 20 mg, Oral, Nightly  ticagrelor, 90 mg, Oral, BID        Vital Sign Min/Max for last 24 hours  Temp  Min: 97.9 °F (36.6 °C)  Max: 99.3 °F (37.4 °C)   BP  Min: 57/43  Max: 164/80   Pulse  Min: 36  Max: 95   Resp  Min: 11  Max: 20   SpO2  Min: 91 %  Max: 99 %   Flow (L/min)  Min: 2  Max: 2       Input/Output for last 24 hour shift  05/07 0701 - 05/08 0700  In: -   Out: 300 [Urine:300]      Objective:  Vital signs: (most recent): Blood pressure 124/76, pulse (!) 40, temperature 97.9 °F (36.6 °C), temperature source Oral, resp. rate 18, height 180.3 cm (71\"), weight 83.1 kg (183 lb 4.8 oz), SpO2 97%.            General Appearance: Awake, alert, in no acute distress   Lungs:   B/L Breath sounds present with decreased breath sounds on bases, no wheezing heard, no crackles.   Heart: S1 and S2 present, no murmur  Abdomen: Soft, nontender, no guarding or rigidity, bowel sounds positive.  Extremities:  No edema, warm to touch.  Right groin access site without any bleeding or hematoma, right wrist occlusive device in place.  Pulses: Positive and symmetric.  Capillary refill: <3s   Neurologic:  Moving all four extremities. Good strength bilaterally.  No focal deficit  Psychological: Normal affect, Cooperative      Results from last 7 days   Lab Units 05/07/24 2035 05/07/24  0419 " 05/06/24  1419   WBC 10*3/mm3 14.77* 14.71* 9.56   HEMOGLOBIN g/dL 14.0 14.0 14.8   PLATELETS 10*3/mm3 226 193 238     Results from last 7 days   Lab Units 05/07/24 2012 05/07/24  0419 05/06/24  1419   SODIUM mmol/L 137 137 139   POTASSIUM mmol/L 3.8 4.1 4.1   CO2 mmol/L 24.0 27.8 28.6   BUN mg/dL 11 15 16   CREATININE mg/dL 0.89 1.04 1.16   MAGNESIUM mg/dL 2.0  --   --    GLUCOSE mg/dL 142* 96 86     Estimated Creatinine Clearance: 102.4 mL/min (by C-G formula based on SCr of 0.89 mg/dL).          Images:   Multiple EKGs reviewed and EKG at 7:55 PM showed changes concerning for acute  inferior STEMI.  Repeat EKG an hour later showed marked sinus bradycardia without any ongoing acute ST changes.  I reviewed the patient's results and images.     Assessment & Plan   Impression        ACS (acute coronary syndrome)       Plan        1.  Patient presented with multivessel coronary disease s/p multiple coronary interventions done today with post procedure.  Arrhythmia and hypotension thought to be due to right ventricular involvement.  Discussed with cardiology team and plan is to symptomatically manage the patient at this point.  If he becomes bradycardic or hypotensive will use dopamine infusion.  May need norepinephrine infusion if gets more hemodynamically unstable.  Fluid boluses as needed.  2.  Hold beta-blockers for now until hemodynamically more stable.  3.  Continue aspirin, statin.  4.  Patient currently on Integrilin per protocol post coronary intervention.  Defer management to cardiology team.  5.  Monitor urine output and electrolytes and replace electrolytes per ICU protocol.  6.  Bedrest for tonight.  7.  Slowly advance diet as tolerated.    Close monitoring in ICU for now    Plan of care and goals reviewed with multidisciplinary/antibiotic stewardship team during rounds.   I discussed the patient's findings and my recommendations with patient and nursing staff       Time spent  35 min  (exclusive of  procedure time)  including high complexity decision making to assess, manipulate, and support vital organ system failure in this individual who has impairment of one or more vital organ systems such that there is a high probability of imminent or life threatening deterioration in the patient’s condition.      Bucky Barker MD, [facility]  Pulmonary, Critical care and Sleep Medicine         Electronically signed by Bucky Barker MD at 05/08/24 0013       Consult Notes (last 24 hours)  Notes from 05/07/24 1418 through 05/08/24 1418   No notes of this type exist for this encounter.

## 2024-05-09 ENCOUNTER — DOCUMENTATION (OUTPATIENT)
Dept: CARDIAC REHAB | Facility: HOSPITAL | Age: 64
End: 2024-05-09
Payer: COMMERCIAL

## 2024-05-09 LAB
QT INTERVAL: 444 MS
QTC INTERVAL: 343 MS

## 2024-05-10 LAB
QT INTERVAL: 376 MS
QT INTERVAL: 396 MS
QTC INTERVAL: 418 MS
QTC INTERVAL: 425 MS

## 2024-05-11 NOTE — PAYOR COMM NOTE
"Jaylan Chicas (63 y.o. Male)       Date of Birth   1960    Social Security Number       Address   [address] [address]    Home Phone   [phone]    MRN   [id number]       Confucianism   None    Marital Status                               Admission Date   5/7/24    Admission Type   Elective    Admitting Provider   Tim Day MD    Attending Provider       Department, Room/Bed   [facility]       Discharge Date   5/8/2024    Discharge Disposition   Home or Self Care    Discharge Destination                                 Attending Provider: (none)   Allergies: No Known Allergies    Isolation: None   Infection: None   Code Status: Prior    Ht: 180.3 cm (71\")   Wt: 83 kg (183 lb)    Admission Cmt: None   Principal Problem: ACS (acute coronary syndrome) [I24.9]                   Active Insurance as of 5/7/2024       Primary Coverage       Payor Plan Insurance Group Employer/Plan Group    AETNA COMMERCIAL AETNA [id number]       Payor Plan Address Payor Plan Phone Number Payor Plan Fax Number Effective Dates    [address] [phone]  6/1/2022 - None Entered    [facility] [address]         Subscriber Name Subscriber Birth Date Member ID       JAYLAN CHICAS 1960 [id number]                     Emergency Contacts        (Rel.) Home Phone Work Phone Mobile Phone    NAINAMARK (Spouse) [phone] -- --              {Documentation Categories:[id number]}  "

## 2024-05-13 NOTE — PAYOR COMM NOTE
"Updated clinical    Timmy Chicas (63 y.o. Male)       Date of Birth   1960    Social Security Number       Address   [address] [facility] [address]    Home Phone   [phone]    MRN   [id number]       Adventism   None    Marital Status                               Admission Date   5/7/24    Admission Type   Elective    Admitting Provider   Tim Day MD    Attending Provider       Department, Room/Bed   [facility] 2HSI, S259/1       Discharge Date   5/8/2024    Discharge Disposition   Home or Self Care    Discharge Destination                                 Attending Provider: (none)   Allergies: No Known Allergies    Isolation: None   Infection: None   Code Status: Prior    Ht: 180.3 cm (71\")   Wt: 83 kg (183 lb)    Admission Cmt: None   Principal Problem: ACS (acute coronary syndrome) [I24.9]                   Active Insurance as of 5/7/2024       Primary Coverage       Payor Plan Insurance Group Employer/Plan Group    [employer]CIAL A[employer]NA [id number]       Payor Plan Address Payor Plan Phone Number Payor Plan Fax Number Effective Dates    [address] [phone]  6/1/2022 - None Entered    [facility] [place] [address]         Subscriber Name Subscriber Birth Date Member ID       TIMMY CHICAS 1960 [id number]                     Emergency Contacts        (Rel.) Home Phone Work Phone Mobile Phone    MARK CHICAS (Spouse) [phone] -- --              Lab Results (all)       Procedure Component Value Units Date/Time    CBC (No Diff) [[id number]]  (Abnormal) Collected: 05/08/24 0400    Specimen: Blood Updated: 05/08/24 0443     WBC 14.43 10*3/mm3      RBC 4.36 10*6/mm3      Hemoglobin 13.3 g/dL      Hematocrit 40.1 %      MCV 92.0 fL      MCH 30.5 pg      MCHC 33.2 g/dL      RDW 14.1 %      RDW-SD 48.0 fl      MPV 11.2 fL      Platelets 215 10*3/mm3     Basic Metabolic Panel [[id number]]  (Abnormal) Collected: 05/08/24 0400    Specimen: Blood " Updated: 05/08/24 0443     Glucose 126 mg/dL      BUN 12 mg/dL      Creatinine 0.97 mg/dL      Sodium 137 mmol/L      Potassium 4.3 mmol/L      Comment: Slight hemolysis detected by analyzer. Result may be falsely elevated.        Chloride 101 mmol/L      CO2 26.0 mmol/L      Calcium 8.7 mg/dL      BUN/Creatinine Ratio 12.4     Anion Gap 10.0 mmol/L      eGFR 88.8 mL/min/1.73     Narrative:      GFR Normal >60  Chronic Kidney Disease <60  Kidney Failure <15      Magnesium [[id number]]  (Normal) Collected: 05/08/24 0400    Specimen: Blood Updated: 05/08/24 0443     Magnesium 2.1 mg/dL     Phosphorus [[id number]]  (Normal) Collected: 05/08/24 0400    Specimen: Blood Updated: 05/08/24 0443     Phosphorus 4.0 mg/dL     High Sensitivity Troponin T 2Hr [[id number]]  (Abnormal) Collected: 05/07/24 2151    Specimen: Blood Updated: 05/07/24 2229     HS Troponin T 522 ng/L      Troponin T Delta 98 ng/L     Narrative:      High Sensitive Troponin T Reference Range:  <14.0 ng/L- Negative Female for AMI  <22.0 ng/L- Negative Male for AMI  >=14 - Abnormal Female indicating possible myocardial injury.  >=22 - Abnormal Male indicating possible myocardial injury.   Clinicians would have to utilize clinical acumen, EKG, Troponin, and serial changes to determine if it is an Acute Myocardial Infarction or myocardial injury due to an underlying chronic condition.         CK Total & CKMB [[id number]]  (Abnormal) Collected: 05/07/24 2012    Specimen: Blood Updated: 05/07/24 2132     CKMB 20.89 ng/mL      Creatine Kinase 187 U/L     Narrative:      CKMB results may be falsely decreased if patient taking Biotin.    Magnesium [[id number]]  (Normal) Collected: 05/07/24 2012    Specimen: Blood Updated: 05/07/24 2110     Magnesium 2.0 mg/dL     High Sensitivity Troponin T [[id number]]  (Abnormal) Collected: 05/07/24 2012    Specimen: Blood Updated: 05/07/24 2103     HS Troponin T 424 ng/L     Narrative:      High Sensitive Troponin T Reference  Range:  <14.0 ng/L- Negative Female for AMI  <22.0 ng/L- Negative Male for AMI  >=14 - Abnormal Female indicating possible myocardial injury.  >=22 - Abnormal Male indicating possible myocardial injury.   Clinicians would have to utilize clinical acumen, EKG, Troponin, and serial changes to determine if it is an Acute Myocardial Infarction or myocardial injury due to an underlying chronic condition.         aPTT [[id number]]  (Abnormal) Collected: 05/07/24 2012    Specimen: Blood Updated: 05/07/24 2100     PTT 44.7 seconds     Narrative:      PTT = The equivalent PTT values for the therapeutic range of heparin levels at 0.3 to 0.5 U/ml are 60 to 70 seconds.    Comprehensive Metabolic Panel [[id number]]  (Abnormal) Collected: 05/07/24 2012    Specimen: Blood Updated: 05/07/24 2056     Glucose 142 mg/dL      BUN 11 mg/dL      Creatinine 0.89 mg/dL      Sodium 137 mmol/L      Potassium 3.8 mmol/L      Chloride 102 mmol/L      CO2 24.0 mmol/L      Calcium 9.0 mg/dL      Total Protein 6.5 g/dL      Albumin 4.1 g/dL      ALT (SGPT) 17 U/L      AST (SGOT) 29 U/L      Alkaline Phosphatase 100 U/L      Total Bilirubin 0.8 mg/dL      Globulin 2.4 gm/dL      Comment: Calculated Result        A/G Ratio 1.7 g/dL      BUN/Creatinine Ratio 12.4     Anion Gap 11.0 mmol/L      eGFR 97.5 mL/min/1.73     Narrative:      GFR Normal >60  Chronic Kidney Disease <60  Kidney Failure <15      Lactic Acid, Plasma [[id number]]  (Normal) Collected: 05/07/24 2012    Specimen: Blood Updated: 05/07/24 2056     Lactate 1.7 mmol/L      Comment: Falsely depressed results may occur on samples drawn from patients receiving N-Acetylcysteine (NAC) or Metamizole.       CBC (No Diff) [[id number]]  (Abnormal) Collected: 05/07/24 2035    Specimen: Blood Updated: 05/07/24 2036     WBC 14.77 10*3/mm3      RBC 4.63 10*6/mm3      Hemoglobin 14.0 g/dL      Hematocrit 42.1 %      MCV 90.9 fL      MCH 30.2 pg      MCHC 33.3 g/dL      RDW 14.0 %      RDW-SD 47.1 fl       MPV 10.7 fL      Platelets 226 10*3/mm3     POC Glucose Once [[id number]]  (Abnormal) Collected: 05/07/24 1956    Specimen: Blood Updated: 05/07/24 1958     Glucose 141 mg/dL           Imaging Results (Most Recent)       None          ECG/EMG Results (all)       Procedure Component Value Units Date/Time    Telemetry Scan [[id number]] Resulted: 05/07/24     Updated: 05/08/24 0006    Telemetry Scan [[id number]] Resulted: 05/07/24     Updated: 05/08/24 0006    Adult Transthoracic Echo Complete W/ Cont if Necessary Per Protocol [[id number]] Resulted: 05/08/24 1310     Updated: 05/08/24 1312     EF(MOD-bp) 68.6 %      LVIDd 4.9 cm      LVIDs 2.7 cm      IVSd 0.85 cm      LVPWd 0.90 cm      FS 45.9 %      IVS/LVPW 0.94 cm      ESV(cubed) 18.6 ml      EDV(cubed) 117.6 ml      LV mass(C)d 147.4 grams      LVOT area 3.5 cm2      LVOT diam 2.10 cm      EDV(MOD-sp2) 154.0 ml      EDV(MOD-sp4) 113.0 ml      ESV(MOD-sp2) 50.5 ml      ESV(MOD-sp4) 34.7 ml      SV(MOD-sp2) 103.5 ml      SV(MOD-sp4) 78.3 ml      EF(MOD-sp2) 67.2 %      EF(MOD-sp4) 69.3 %      MV E max jackson 87.7 cm/sec      MV A max jackson 48.9 cm/sec      MV dec time 0.20 sec      MV E/A 1.79     Med Peak E' Jackson 10.6 cm/sec      Lat Peak E' Jackson 12.1 cm/sec      Avg E/e' ratio 7.73     SV(LVOT) 84.2 ml      RV Base 5.0 cm      RV Mid 3.6 cm      RV Length 8.6 cm      TAPSE (>1.6) 3.5 cm      RV S' 15.3 cm/sec      LA dimension (2D)  3.5 cm      LV V1 max 100.9 cm/sec      LV V1 max PG 4.1 mmHg      LV V1 mean PG 2.00 mmHg      LV V1 VTI 24.3 cm      Ao pk jackson 128.5 cm/sec      Ao max PG 6.6 mmHg      Ao mean PG 3.0 mmHg      Ao V2 VTI 30.7 cm      HEIDI(I,D) 2.7 cm2      MV P1/2t 60.9 msec      MVA(P1/2t) 3.6 cm2      MV dec slope 469.0 cm/sec2      PA V2 max 111.0 cm/sec      PA acc time 0.15 sec      PI end-d jackson 73.8 cm/sec      Ao root diam 3.5 cm      Echo EF Estimated 68.0 %      IVRT 70.0 ms      LA ESV Index (BP) 32.0 ml/m2      RAP systole 8 mmHg      PAPd(PI edV) 2  mmHg      BH CV VAS BP LEFT /64 mmHg     Narrative:        Left ventricular systolic function is normal. Left ventricular ejection   fraction appears to be 66 - 70%.    No significant valvular abnormalities.      ECG 12 Lead Chest Pain [[id number]] Collected: 05/08/24 0552     Updated: 05/08/24 1718     QT Interval 396 ms      QTC Interval 430 ms     Narrative:      Test Reason : Chest Pain  Blood Pressure :   */*   mmHG  Vent. Rate :  71 BPM     Atrial Rate :  71 BPM     P-R Int : 176 ms          QRS Dur :  86 ms      QT Int : 396 ms       P-R-T Axes :  81  23  60 degrees     QTc Int : 430 ms    Normal sinus rhythm  Inferior infarct (cited on or before 07-MAY-2024)  Abnormal ECG  When compared with ECG of 07-MAY-2024 22:52, (Unconfirmed)  Vent. rate has increased BY  35 BPM  QT has lengthened  Confirmed by RACHEL ULLOA ([id number]) on 5/8/2024 5:17:44 PM    Referred By:            Confirmed By: RACEHL ULLOA    ECG 12 Lead Bradycardia [[id number]] Collected: 05/07/24 2252     Updated: 05/09/24 1254     QT Interval 444 ms      QTC Interval 343 ms     Narrative:      Test Reason : Bradycardia  Blood Pressure :   */*   mmHG  Vent. Rate :  36 BPM     Atrial Rate :  36 BPM     P-R Int : 162 ms          QRS Dur :  94 ms      QT Int : 444 ms       P-R-T Axes :  79  36  44 degrees     QTc Int : 343 ms    Marked sinus bradycardia with marked sinus arrhythmia  Possible Left atrial enlargement  Inferior infarct (cited on or before 07-MAY-2024)  Abnormal ECG  When compared with ECG of 07-MAY-2024 19:55, (Unconfirmed)  Vent. rate has decreased BY  41 BPM  QT has shortened  Confirmed by GIULIANO VALERA ([id number]) on 5/9/2024 12:54:48 PM    Referred By:            Confirmed By: GIULIANO VALERA    ECG 12 Lead Chest Pain [[id number]] Collected: 05/07/24 1716     Updated: 05/10/24 0710     QT Interval 396 ms      QTC Interval 418 ms     Narrative:      Test Reason : Chest Pain  Blood Pressure :   */*   mmHG  Vent. Rate :  67 BPM     Atrial  Rate :  67 BPM     P-R Int : 168 ms          QRS Dur :  94 ms      QT Int : 396 ms       P-R-T Axes :  79  47  78 degrees     QTc Int : 418 ms    Normal sinus rhythm with sinus arrhythmia  Acute inferiorinjury pattern  Consider right ventricular involvement in acute inferior infarct  Abnormal ECG  When compared with ECG of 06-MAY-2024 18:38, (Unconfirmed)  Inferior infarct is now present  ST more elevated in Inferior leads  Confirmed by DYLAN DAY MD ([id number]) on 5/10/2024 7:10:45 AM    Referred By: DR. DAY           Confirmed By: DYLAN DAY MD    ECG 12 Lead Chest Pain [[id number]] Collected: 05/07/24 1955     Updated: 05/10/24 0711     QT Interval 376 ms      QTC Interval 425 ms     Narrative:      Test Reason : Chest Pain  Blood Pressure :   */*   mmHG  Vent. Rate :  77 BPM     Atrial Rate :  77 BPM     P-R Int : 166 ms          QRS Dur :  94 ms      QT Int : 376 ms       P-R-T Axes :  77  44  56 degrees     QTc Int : 425 ms    ** Critical Test Result: STEMI  Normal sinus rhythm  Acute inferior injury pattern.  Consider right ventricular involvement in acute inferior infarct  Abnormal ECG  When compared with ECG of 07-MAY-2024 17:16, (Unconfirmed)  No significant change was found  Confirmed by DYLAN DAY MD ([id number]) on 5/10/2024 7:11:04 AM    Referred By:            Confirmed By: DYLAN DAY MD             Physician Progress Notes (all)        Dylan Day MD at 05/08/24 0650          [facility]  IP Progress Note      Chief Complaint: Follow-up of ACS/CAD    Subjective   The patient underwent two-vessel stenting yesterday and was moved to telemetry where he complained of mild ongoing discomfort with EKG showing subtle inferior injury pattern.  He was also hypertensive and required increasing the dose of nitroglycerin as well as IV and oral Lopressor.  Subsequently he developed bradycardia with drop in blood pressure and was transferred to the ICU.  Received volume  "resuscitation.  His chest pain has completely resolved.  States that he will got up and walked at 4 AM.  Currently resting, denies chest pain or shortness of breath and feels much better.    Objective     Blood pressure 118/65, pulse 75, temperature 97.9 °F (36.6 °C), temperature source Oral, resp. rate 16, height 180.3 cm (71\"), weight 83.1 kg (183 lb 4.8 oz), SpO2 94%.     Intake/Output Summary (Last 24 hours) at 5/8/2024 0650  Last data filed at 5/8/2024 0600  Gross per 24 hour   Intake --   Output 800 ml   Net -800 ml       Physical Exam:  General: No acute distress.   Neck: no JVD.  Chest:No respiratory distress, breath sounds are normal. No wheezes,  rhonchi or rales.  Cardiovascular: Normal S1 and S2, no murmur, gallop or rub.    Extremities: No edema.  Right wrist and right groin stable.    Results Review:     I reviewed the patient's new clinical results.    Results from last 7 days   Lab Units 05/08/24  0400   WBC 10*3/mm3 14.43*   HEMOGLOBIN g/dL 13.3   HEMATOCRIT % 40.1   PLATELETS 10*3/mm3 215     Results from last 7 days   Lab Units 05/08/24 0400 05/07/24 2012   SODIUM mmol/L 137 137   POTASSIUM mmol/L 4.3 3.8   CHLORIDE mmol/L 101 102   CO2 mmol/L 26.0 24.0   BUN mg/dL 12 11   CREATININE mg/dL 0.97 0.89   CALCIUM mg/dL 8.7 9.0   BILIRUBIN mg/dL  --  0.8   ALK PHOS U/L  --  100   ALT (SGPT) U/L  --  17   AST (SGOT) U/L  --  29   GLUCOSE mg/dL 126* 142*     Results from last 7 days   Lab Units 05/08/24 0400   SODIUM mmol/L 137   POTASSIUM mmol/L 4.3   CHLORIDE mmol/L 101   CO2 mmol/L 26.0   BUN mg/dL 12   CREATININE mg/dL 0.97   GLUCOSE mg/dL 126*   CALCIUM mg/dL 8.7     Results from last 7 days   Lab Units 05/06/24  1419   INR  0.93     Lab Results   Component Value Date    CKTOTAL 187 05/07/2024    CKMB 20.89 (H) 05/07/2024    TROPONINT 522 (C) 05/07/2024         Results from last 7 days   Lab Units 05/07/24  0419   CHOLESTEROL mg/dL 194   TRIGLYCERIDES mg/dL 119   HDL CHOL mg/dL 43   LDL CHOL " mg/dL 129*     aspirin, 81 mg, Oral, Daily  [Held by provider] metoprolol tartrate, 12.5 mg, Oral, Q12H  [START ON 5/9/2024] pharmacy consult - [place], , Does not apply, Daily  rosuvastatin, 20 mg, Oral, Nightly  ticagrelor, 90 mg, Oral, BID       Tele: Sinus Rythym      Assessment:  ACS/severe two-vessel CAD.  Status post YUDELKA x 2 to the RCA and YUDELKA x 1 to the LAD.  Postprocedure chest pain with EKG abnormalities, suspect small vessel involvement, his chest pain has completely resolved and ST segments have returned to baseline.  He had continuous elevation of high-sensitivity troponin however total CPK within normal limits.  Bradycardia/hypotension periprocedurally and partly related to medications, resolved/stable.  Hypertension, controlled.  Dyslipidemia.  Plan:  Continue IV Integrilin until the current bottle is infused.  Continue aspirin and Brilinta, continue high intensity statin.  Restart metoprolol 12.5 mg twice daily.  Echocardiogram today to assess LV function and valve function.  Increase activities.  Consider discharging later today after echocardiogram and after ambulation if he remains stable.    Tim Day MD, [facility], [facility]                                      Electronically signed by Tim Day MD at 05/08/24 0743       Bucky Barker MD at 05/08/24 0001            Intensive Care Follow-up     Hospital:  LOS: 1 day   Mr. Jaylan Chicas, 63 y.o. male is followed for:   ACS (acute coronary syndrome)     Subjective       History of present illness:   63-year-old male transferred to intensive care unit for ongoing bradycardia, hemodynamic instability, diaphoresis.  Patient without any other chronic medical illness other than history of testicular cancer in the past presented with chest pressure which was radiating to his neck.  Patient presented to outside facility.  Patient had mild elevation of high-sensitivity troponin.  Underwent coronary CT angiogram which revealed moderate to high-grade stenosis of  LAD, mild to moderate stenosis of left circumflex and high-grade stenosis in distal RCA.  Patient had ST depressions in leads III and aVF.  Patient subsequently underwent coronary angiogram which revealed multivessel coronary disease with high risk lesions in LAD and RCA.  Patient was discussed with interventional team here at [facility] and accepted by Dr. Day for PCI.  Patient underwent PTCA/stenting of mid RCA, PTCA/stenting of distal RCA and proximal LAD.  Radial access was used.    Postprocedure patient was admitted to telemetry floor where he started having nausea and chest pain.  EKG revealed changes concerning for inferior STEMI.  Cardiology team was contacted and interventional cardiology was also contacted.  Decision was made not to proceed with any further coronary intervention but manage patient symptomatically with possible hypotension from right ventricular involvement.  Patient was given  500 mL of saline bolus and transferred to intensive care unit for closer monitoring.    On my arrival patient is appearing comfortable.  States that he is just sad so many things are going on with his heart.  Try to reassure her that we are trying our best to take care of him.  Currently he is chest pain-free.  Denies any dizziness.  Denies any nausea.  Heart rate is in 70s.  Sinus rhythm.  Blood pressure is acceptable.          The patient's past medical, surgical and social history were reviewed and updated in Epic as appropriate.    Objective   Objective     Infusions:  eptifibatide, 2 mcg/kg/min, Last Rate: 2 mcg/kg/min (05/07/24 2236)  norepinephrine, 0.02-0.3 mcg/kg/min      Medications:  aspirin, 81 mg, Oral, Daily  [Held by provider] metoprolol tartrate, 12.5 mg, Oral, Q12H  rosuvastatin, 20 mg, Oral, Nightly  ticagrelor, 90 mg, Oral, BID        Vital Sign Min/Max for last 24 hours  Temp  Min: 97.9 °F (36.6 °C)  Max: 99.3 °F (37.4 °C)   BP  Min: 57/43  Max: 164/80   Pulse  Min: 36  Max: 95   Resp  Min: 11   "Max: 20   SpO2  Min: 91 %  Max: 99 %   Flow (L/min)  Min: 2  Max: 2       Input/Output for last 24 hour shift  05/07 0701 - 05/08 0700  In: -   Out: 300 [Urine:300]      Objective:  Vital signs: (most recent): Blood pressure 124/76, pulse (!) 40, temperature 97.9 °F (36.6 °C), temperature source Oral, resp. rate 18, height 180.3 cm (71\"), weight 83.1 kg (183 lb 4.8 oz), SpO2 97%.            General Appearance: Awake, alert, in no acute distress   Lungs:   B/L Breath sounds present with decreased breath sounds on bases, no wheezing heard, no crackles.   Heart: S1 and S2 present, no murmur  Abdomen: Soft, nontender, no guarding or rigidity, bowel sounds positive.  Extremities:  No edema, warm to touch.  Right groin access site without any bleeding or hematoma, right wrist occlusive device in place.  Pulses: Positive and symmetric.  Capillary refill: <3s   Neurologic:  Moving all four extremities. Good strength bilaterally.  No focal deficit  Psychological: Normal affect, Cooperative      Results from last 7 days   Lab Units 05/07/24 2035 05/07/24 0419 05/06/24  1419   WBC 10*3/mm3 14.77* 14.71* 9.56   HEMOGLOBIN g/dL 14.0 14.0 14.8   PLATELETS 10*3/mm3 226 193 238     Results from last 7 days   Lab Units 05/07/24 2012 05/07/24  0419 05/06/24  1419   SODIUM mmol/L 137 137 139   POTASSIUM mmol/L 3.8 4.1 4.1   CO2 mmol/L 24.0 27.8 28.6   BUN mg/dL 11 15 16   CREATININE mg/dL 0.89 1.04 1.16   MAGNESIUM mg/dL 2.0  --   --    GLUCOSE mg/dL 142* 96 86     Estimated Creatinine Clearance: 102.4 mL/min (by C-G formula based on SCr of 0.89 mg/dL).          Images:   Multiple EKGs reviewed and EKG at 7:55 PM showed changes concerning for acute  inferior STEMI.  Repeat EKG an hour later showed marked sinus bradycardia without any ongoing acute ST changes.  I reviewed the patient's results and images.     Assessment & Plan   Impression        ACS (acute coronary syndrome)       Plan        1.  Patient presented with multivessel " coronary disease s/p multiple coronary interventions done today with post procedure.  Arrhythmia and hypotension thought to be due to right ventricular involvement.  Discussed with cardiology team and plan is to symptomatically manage the patient at this point.  If he becomes bradycardic or hypotensive will use dopamine infusion.  May need norepinephrine infusion if gets more hemodynamically unstable.  Fluid boluses as needed.  2.  Hold beta-blockers for now until hemodynamically more stable.  3.  Continue aspirin, statin.  4.  Patient currently on Integrilin per protocol post coronary intervention.  Defer management to cardiology team.  5.  Monitor urine output and electrolytes and replace electrolytes per ICU protocol.  6.  Bedrest for tonight.  7.  Slowly advance diet as tolerated.    Close monitoring in ICU for now    Plan of care and goals reviewed with multidisciplinary/antibiotic stewardship team during rounds.   I discussed the patient's findings and my recommendations with patient and nursing staff       Time spent  35 min  (exclusive of procedure time)  including high complexity decision making to assess, manipulate, and support vital organ system failure in this individual who has impairment of one or more vital organ systems such that there is a high probability of imminent or life threatening deterioration in the patient’s condition.      Bucky Barker MD, [facility]  Pulmonary, Critical care and Sleep Medicine         Electronically signed by Bucky Barker MD at 05/08/24 0013       Consult Notes (all)    No notes of this type exist for this encounter.          Discharge Summary        Allie Jaime PA-C at 05/08/24 1700       Attestation signed by Tim Day MD at 05/09/24 7647    I have reviewed this documentation and agree.                  Progress note from 5/8/2024 to serve as discharge summary.    Electronically signed by Tim Day MD at 05/09/24 1286

## 2024-05-14 ENCOUNTER — READMISSION MANAGEMENT (OUTPATIENT)
Dept: CALL CENTER | Facility: HOSPITAL | Age: 64
End: 2024-05-14
Payer: COMMERCIAL

## 2024-05-14 NOTE — OUTREACH NOTE
Medical Week 1 Survey      Flowsheet Row Responses   [facility] patient discharged from? [place]   Does the patient have one of the following disease processes/diagnoses(primary or secondary)? Other   Week 1 attempt successful? No   Unsuccessful attempts Attempt 1  [attempted all numbers.]            Cecille ROMEO - Registered Nurse

## 2024-05-16 ENCOUNTER — DOCUMENTATION (OUTPATIENT)
Dept: CARDIAC REHAB | Facility: HOSPITAL | Age: 64
End: 2024-05-16
Payer: COMMERCIAL

## 2024-05-16 NOTE — PROGRESS NOTES
Cardiac Rehab staff mailed referral letter to patient regarding Phase II Cardiac Rehab program. Instruction for patient to contact [facility] Cardiac Rehab Department for additional program information and to forward referral to closest Cardiac Rehab program.

## 2024-05-20 LAB — CATH EF ESTIMATED: 55 %

## 2024-05-23 ENCOUNTER — READMISSION MANAGEMENT (OUTPATIENT)
Dept: CALL CENTER | Facility: HOSPITAL | Age: 64
End: 2024-05-23
Payer: COMMERCIAL

## 2024-05-23 NOTE — OUTREACH NOTE
Medical Week 3 Survey      Flowsheet Row Responses   [facility] patient discharged from? [place]   Does the patient have one of the following disease processes/diagnoses(primary or secondary)? Other   Week 3 attempt successful? No   Unsuccessful attempts Attempt 1            DONNELL Hall Registered Nurse

## 2024-05-28 ENCOUNTER — READMISSION MANAGEMENT (OUTPATIENT)
Dept: CALL CENTER | Facility: HOSPITAL | Age: 64
End: 2024-05-28
Payer: COMMERCIAL

## 2024-05-28 NOTE — OUTREACH NOTE
Medical Week 3 Survey      Flowsheet Row Responses   [facility] patient discharged from? [place]   Does the patient have one of the following disease processes/diagnoses(primary or secondary)? Other   Week 3 attempt successful? Yes   Call start time 1124   Call end time 1137   Discharge diagnosis ACS (acute coronary syndrome), heart cath & stent x 2   Meds reviewed with patient/caregiver? Yes   Is the patient having any side effects they believe may be caused by any medication additions or changes? No   Does the patient have all medications ordered at discharge? Yes   Is the patient taking all medications as directed (includes completed medication regime)? Yes   Does the patient have a primary care provider?  Yes   Does the patient have an appointment with their PCP within 7 days of discharge? Yes   Has the patient kept scheduled appointments due by today? Yes   Has home health visited the patient within 72 hours of discharge? N/A   Psychosocial issues? No   Comments living in [facility], was visiting in [place] when hospitalized, states has been able to return to normal exercise and tolerating well   Did the patient receive a copy of their discharge instructions? Yes   Nursing interventions Reviewed instructions with patient   What is the patient's perception of their health status since discharge? Improving   Is the patient/caregiver able to teach back signs and symptoms related to disease process for when to call PCP? Yes   Is the patient/caregiver able to teach back signs and symptoms related to disease process for when to call 911? Yes   Is the patient/caregiver able to teach back the hierarchy of who to call/visit for symptoms/problems? PCP, Specialist, Home health nurse, Urgent Care, ED, 911 Yes   If the patient is a current smoker, are they able to teach back resources for cessation? Not a smoker   Additional teach back comments pt states has no cardiac symptoms , able to run 3-7 miles/day, has had some  dizziness and has adjusted Metoprolol dosing when BP on low end as prescribed   Week 3 Call Completed? Yes   Graduated Yes   Is the patient interested in additional calls from an ambulatory ? No   Would this patient benefit from a Referral to [facility] Social Work? No   Call end time [id number]            Chantale SHEPHERD - Registered Nurse

## 2024-10-18 RX ORDER — ASPIRIN 81 MG/1
81 TABLET, COATED ORAL DAILY
Qty: 90 TABLET | Refills: 1 | OUTPATIENT
Start: 2024-10-18

## 2024-10-21 RX ORDER — METOPROLOL SUCCINATE 25 MG/1
25 TABLET, EXTENDED RELEASE ORAL DAILY
Qty: 90 TABLET | Refills: 1 | Status: SHIPPED | OUTPATIENT
Start: 2024-10-21

## 2024-10-24 RX ORDER — ASPIRIN 81 MG/1
81 TABLET ORAL DAILY
Qty: 90 TABLET | Refills: 0 | Status: SHIPPED | OUTPATIENT
Start: 2024-10-24

## 2025-04-18 RX ORDER — METOPROLOL SUCCINATE 25 MG/1
25 TABLET, EXTENDED RELEASE ORAL DAILY
Qty: 90 TABLET | Refills: 1 | Status: SHIPPED | OUTPATIENT
Start: 2025-04-18

## 2025-04-18 RX ORDER — ASPIRIN 81 MG/1
TABLET, COATED ORAL
Qty: 90 TABLET | Refills: 0 | Status: SHIPPED | OUTPATIENT
Start: 2025-04-18

## 2025-07-14 RX ORDER — ASPIRIN 81 MG/1
TABLET, COATED ORAL
Qty: 90 TABLET | Refills: 0 | Status: SHIPPED | OUTPATIENT
Start: 2025-07-14

## (undated) DEVICE — ADULT, W/LG. BACK PAD, RADIOTRANSPARENT ELEMENT AND LEAD WIRE COMPATIBLE W/: Brand: DEFIBRILLATION ELECTRODES

## (undated) DEVICE — PINNACLE INTRODUCER SHEATH: Brand: PINNACLE

## (undated) DEVICE — GW J/TP MOVE/CORE 0.035 3MM/TP 145CM

## (undated) DEVICE — MODEL AT P65, P/N 701554-001KIT CONTENTS: HAND CONTROLLER, 3-WAY HIGH-PRESSURE STOPCOCK WITH ROTATING END AND PREMIUM HIGH-PRESSURE TUBING: Brand: ANGIOTOUCH® KIT

## (undated) DEVICE — CATH F5 INF JL 4 100CM: Brand: INFINITI

## (undated) DEVICE — ADULT DISPOSABLE SINGLE-PATIENT USE PULSE OXIMETER SENSOR: Brand: NONIN

## (undated) DEVICE — PK CATH CARD 70

## (undated) DEVICE — GUIDE CATHETER: Brand: MACH1™

## (undated) DEVICE — PAD, DEFIB, ADULT, RADIOTRANS, ZOLL: Brand: MEDLINE

## (undated) DEVICE — TREK CORONARY DILATATION CATHETER 2.50 MM X 12 MM / RAPID-EXCHANGE: Brand: TREK

## (undated) DEVICE — NC TREK NEO™ CORONARY DILATATION CATHETER 3.00 MM X 12 MM / RAPID-EXCHANGE: Brand: NC TREK NEO™

## (undated) DEVICE — MODEL BT2000 P/N 700287-012KIT CONTENTS: MANIFOLD WITH SALINE AND CONTRAST PORTS, SALINE TUBING WITH SPIKE AND HAND SYRINGE, TRANSDUCER: Brand: BT2000 AUTOMATED MANIFOLD KIT

## (undated) DEVICE — DRSNG SURESITE WNDW 4X4.5

## (undated) DEVICE — HI-TORQUE VERSATURN F GUIDE WIRE FULLY COATED .014 STRAIGHT TIP 190 CM: Brand: HI-TORQUE VERSATURN

## (undated) DEVICE — ASMBL SPK CONTRST CONTRL

## (undated) DEVICE — DEV INFL MONARCH 25W

## (undated) DEVICE — INTRO SHEATH PRELUDE IDEAL SPRNG COIL 021 6F 23X80CM

## (undated) DEVICE — 6F .070 XB LAD 3.5 100CM: Brand: VISTA BRITE TIP

## (undated) DEVICE — NC TREK NEO™ CORONARY DILATATION CATHETER 4.50 MM X 20 MM / RAPID-EXCHANGE: Brand: NC TREK NEO™

## (undated) DEVICE — ANGIO-SEAL VIP VASCULAR CLOSURE DEVICE: Brand: ANGIO-SEAL

## (undated) DEVICE — Device: Brand: OMNIWIRE PRESSURE GUIDE WIRE

## (undated) DEVICE — CATH F5 INF 3DRC 100CM: Brand: INFINITI

## (undated) DEVICE — GW INQWIRE FC PTFE STD J/1.5 .035 260

## (undated) DEVICE — 360 - 360I 10"/10" CMSQ 8RA: Brand: MEDLINE

## (undated) DEVICE — ST EXT IV SMRTSTE 2VLV FIX M LL 6ML 41

## (undated) DEVICE — SHEATH INTRO SUPERSHEATH SHRT .035 4F 11CM

## (undated) DEVICE — PK CATH CARD 10

## (undated) DEVICE — CATH F5 INF PIG145 110CM 6SH: Brand: INFINITI

## (undated) DEVICE — MANIFLD NAMIC PRECEPTOR INTEGR/TRANSD RT/HND 1/PRT 500PSI

## (undated) DEVICE — LN FLTR ORL/NASL MICROSTREAM NONINTUB A/LNG

## (undated) DEVICE — ST INF PRI SMRTSTE 20DRP 2VLV 24ML 117

## (undated) DEVICE — TR BAND RADIAL ARTERY COMPRESSION DEVICE: Brand: TR BAND